# Patient Record
Sex: MALE | Race: WHITE | NOT HISPANIC OR LATINO | Employment: OTHER | URBAN - METROPOLITAN AREA
[De-identification: names, ages, dates, MRNs, and addresses within clinical notes are randomized per-mention and may not be internally consistent; named-entity substitution may affect disease eponyms.]

---

## 2019-12-03 ENCOUNTER — HISTORICAL (OUTPATIENT)
Dept: RADIOLOGY | Facility: HOSPITAL | Age: 79
End: 2019-12-03

## 2023-04-26 ENCOUNTER — HOSPITAL ENCOUNTER (INPATIENT)
Facility: HOSPITAL | Age: 83
LOS: 11 days | Discharge: HOME | DRG: 177 | End: 2023-05-08
Attending: FAMILY MEDICINE | Admitting: FAMILY MEDICINE
Payer: MEDICARE

## 2023-04-26 DIAGNOSIS — A49.8 CLOSTRIDIUM DIFFICILE INFECTION: Primary | ICD-10-CM

## 2023-04-26 DIAGNOSIS — Z01.818 PREOP EXAMINATION: ICD-10-CM

## 2023-04-26 DIAGNOSIS — E86.0 DEHYDRATION: ICD-10-CM

## 2023-04-26 DIAGNOSIS — K26.9 DUODENAL ULCER: Chronic | ICD-10-CM

## 2023-04-26 DIAGNOSIS — D62 ACUTE BLOOD LOSS ANEMIA: ICD-10-CM

## 2023-04-26 PROBLEM — R53.1 WEAKNESS: Status: ACTIVE | Noted: 2023-04-26

## 2023-04-26 PROBLEM — D64.89 OTHER SPECIFIED ANEMIAS: Status: ACTIVE | Noted: 2023-04-26

## 2023-04-26 PROBLEM — R19.7 DIARRHEA OF PRESUMED INFECTIOUS ORIGIN: Status: ACTIVE | Noted: 2023-04-26

## 2023-04-26 PROBLEM — I10 ESSENTIAL HYPERTENSION: Status: ACTIVE | Noted: 2023-04-26

## 2023-04-26 PROBLEM — R53.82 CHRONIC FATIGUE: Status: ACTIVE | Noted: 2023-04-26

## 2023-04-26 LAB
AMYLASE SERPL-CCNC: 62 UNIT/L (ref 20–160)
APPEARANCE UR: CLEAR
BILIRUB UR QL STRIP.AUTO: NEGATIVE MG/DL
COLOR UR AUTO: YELLOW
FERRITIN SERPL-MCNC: 42.25 NG/ML (ref 21.81–274.66)
FOLATE SERPL-MCNC: 13.4 NG/ML (ref 7–31.4)
GLUCOSE UR QL STRIP.AUTO: NEGATIVE MG/DL
INR BLD: 1.01 (ref 0–1.3)
IRON SATN MFR SERPL: 5 % (ref 20–50)
IRON SERPL-MCNC: 16 UG/DL (ref 65–175)
KETONES UR QL STRIP.AUTO: NEGATIVE MG/DL
LEUKOCYTE ESTERASE UR QL STRIP.AUTO: NEGATIVE UNIT/L
LIPASE SERPL-CCNC: 34 U/L
NITRITE UR QL STRIP.AUTO: NEGATIVE
PH UR STRIP.AUTO: 5 [PH]
PROT UR QL STRIP.AUTO: NEGATIVE MG/DL
PROTHROMBIN TIME: 13.6 SECONDS (ref 12.5–14.5)
RBC UR QL AUTO: NEGATIVE UNIT/L
RET# (OHS): 0.06 (ref 0.03–0.1)
RETICULOCYTE COUNT AUTOMATED (OLG): 2 % (ref 1.1–2.1)
SP GR UR STRIP.AUTO: 1.02
TIBC SERPL-MCNC: 278 UG/DL (ref 69–240)
TIBC SERPL-MCNC: 294 UG/DL (ref 250–450)
UROBILINOGEN UR STRIP-ACNC: 0.2 MG/DL

## 2023-04-26 PROCEDURE — 82607 VITAMIN B-12: CPT | Performed by: FAMILY MEDICINE

## 2023-04-26 PROCEDURE — 82150 ASSAY OF AMYLASE: CPT | Performed by: FAMILY MEDICINE

## 2023-04-26 PROCEDURE — 82746 ASSAY OF FOLIC ACID SERUM: CPT | Performed by: FAMILY MEDICINE

## 2023-04-26 PROCEDURE — 83550 IRON BINDING TEST: CPT | Performed by: FAMILY MEDICINE

## 2023-04-26 PROCEDURE — 85610 PROTHROMBIN TIME: CPT | Performed by: FAMILY MEDICINE

## 2023-04-26 PROCEDURE — 81003 URINALYSIS AUTO W/O SCOPE: CPT | Performed by: FAMILY MEDICINE

## 2023-04-26 PROCEDURE — 25000003 PHARM REV CODE 250: Performed by: FAMILY MEDICINE

## 2023-04-26 PROCEDURE — 82728 ASSAY OF FERRITIN: CPT | Performed by: FAMILY MEDICINE

## 2023-04-26 PROCEDURE — 84466 ASSAY OF TRANSFERRIN: CPT | Performed by: FAMILY MEDICINE

## 2023-04-26 PROCEDURE — 83690 ASSAY OF LIPASE: CPT | Performed by: FAMILY MEDICINE

## 2023-04-26 PROCEDURE — G0378 HOSPITAL OBSERVATION PER HR: HCPCS

## 2023-04-26 PROCEDURE — 85045 AUTOMATED RETICULOCYTE COUNT: CPT | Performed by: FAMILY MEDICINE

## 2023-04-26 PROCEDURE — G0379 DIRECT REFER HOSPITAL OBSERV: HCPCS

## 2023-04-26 RX ORDER — SIMETHICONE 80 MG
1 TABLET,CHEWABLE ORAL 4 TIMES DAILY PRN
Status: DISCONTINUED | OUTPATIENT
Start: 2023-04-26 | End: 2023-05-08 | Stop reason: HOSPADM

## 2023-04-26 RX ORDER — ACETAMINOPHEN 325 MG/1
650 TABLET ORAL EVERY 8 HOURS PRN
Status: DISCONTINUED | OUTPATIENT
Start: 2023-04-26 | End: 2023-05-08 | Stop reason: HOSPADM

## 2023-04-26 RX ORDER — SPIRONOLACTONE AND HYDROCHLOROTHIAZIDE 25; 25 MG/1; MG/1
1 TABLET ORAL
Status: ON HOLD | COMMUNITY
Start: 2023-04-23 | End: 2023-05-08 | Stop reason: HOSPADM

## 2023-04-26 RX ORDER — HYDRALAZINE HYDROCHLORIDE 25 MG/1
25 TABLET, FILM COATED ORAL 2 TIMES DAILY
COMMUNITY
Start: 2023-04-23

## 2023-04-26 RX ORDER — ONDANSETRON 2 MG/ML
4 INJECTION INTRAMUSCULAR; INTRAVENOUS EVERY 8 HOURS PRN
Status: DISCONTINUED | OUTPATIENT
Start: 2023-04-26 | End: 2023-04-27

## 2023-04-26 RX ORDER — SODIUM CHLORIDE 9 MG/ML
INJECTION, SOLUTION INTRAVENOUS CONTINUOUS
Status: DISCONTINUED | OUTPATIENT
Start: 2023-04-26 | End: 2023-04-27

## 2023-04-26 RX ORDER — OLMESARTAN MEDOXOMIL 40 MG/1
40 TABLET ORAL
COMMUNITY
Start: 2023-04-23

## 2023-04-26 RX ORDER — TAMSULOSIN HYDROCHLORIDE 0.4 MG/1
1 CAPSULE ORAL NIGHTLY
COMMUNITY
Start: 2023-03-23

## 2023-04-26 RX ORDER — AMLODIPINE BESYLATE 10 MG/1
10 TABLET ORAL NIGHTLY
COMMUNITY
Start: 2023-04-23

## 2023-04-26 RX ORDER — MELOXICAM 15 MG/1
15 TABLET ORAL
Status: ON HOLD | COMMUNITY
Start: 2023-04-10 | End: 2023-05-05 | Stop reason: HOSPADM

## 2023-04-26 RX ORDER — FERROUS GLUCONATE 324(38)MG
324 TABLET ORAL
Status: DISCONTINUED | OUTPATIENT
Start: 2023-04-27 | End: 2023-05-08 | Stop reason: HOSPADM

## 2023-04-26 RX ORDER — PANTOPRAZOLE SODIUM 40 MG/1
40 TABLET, DELAYED RELEASE ORAL DAILY
Status: DISCONTINUED | OUTPATIENT
Start: 2023-04-27 | End: 2023-04-27

## 2023-04-26 RX ORDER — NEBIVOLOL 5 MG/1
5 TABLET ORAL
COMMUNITY
Start: 2023-04-24

## 2023-04-26 RX ORDER — SODIUM CHLORIDE 0.9 % (FLUSH) 0.9 %
3 SYRINGE (ML) INJECTION EVERY 12 HOURS PRN
Status: DISCONTINUED | OUTPATIENT
Start: 2023-04-26 | End: 2023-05-08 | Stop reason: HOSPADM

## 2023-04-26 RX ORDER — ACETAMINOPHEN 325 MG/1
650 TABLET ORAL EVERY 4 HOURS PRN
Status: DISCONTINUED | OUTPATIENT
Start: 2023-04-26 | End: 2023-05-08 | Stop reason: HOSPADM

## 2023-04-26 RX ORDER — ASCORBIC ACID 250 MG
250 TABLET ORAL DAILY
Status: DISCONTINUED | OUTPATIENT
Start: 2023-04-27 | End: 2023-05-08 | Stop reason: HOSPADM

## 2023-04-26 RX ADMIN — SODIUM CHLORIDE: 9 INJECTION, SOLUTION INTRAVENOUS at 10:04

## 2023-04-26 RX ADMIN — SODIUM CHLORIDE: 9 INJECTION, SOLUTION INTRAVENOUS at 04:04

## 2023-04-26 NOTE — H&P
[facility]  [facility] Medicine  History & Physical    Patient Name: Claus Mcguire  MRN: [id number]  Patient Class: OP- Observation  Admission Date: 4/26/2023  Attending Physician: Juan Quispe MD   Primary Care Provider: Juan Quispe MD         Patient information was obtained from patient, spouse/SO and ER records.     Subjective:     Principal Problem:Dehydration    Chief Complaint:   Chief Complaint   Patient presents with    Dehydration     Diarrhea x 2 days with weakness and dehydration        HPI: The 82 year old known to me from clinic.  Presented with 3 days of weakness some nausea but no vomiting no recent travel no questionable food.  Labs were done today which showed increased BUN and creatinine and decreased sodium and chloride.  Both consistent with dehydration.  Also noted was a decrease in hemoglobin hematocrit 11 hemoglobin down to 8.1 in last 6 months.  Patient is weak at this time.  We are admitting the patient for IV fluid hydration workup of his diarrhea and workup of his acute anemia.      hypertension    No past surgical history    Review of patient's allergies indicates:   Allergen Reactions    Penicillins Swelling       No current facility-administered medications on file prior to encounter.     No current outpatient medications on file prior to encounter.     Family History    None       Tobacco Use    Smoking status: Not on file    Smokeless tobacco: Not on file   Substance and Sexual Activity    Alcohol use: Not on file    Drug use: Not on file    Sexual activity: Not on file     Review of Systems   Constitutional:  Positive for appetite change and fatigue. Negative for fever.   HENT:  Positive for congestion and rhinorrhea.    Eyes: Negative.    Respiratory:  Positive for cough.    Cardiovascular: Negative.    Gastrointestinal:  Positive for diarrhea and nausea. Negative for vomiting.   Endocrine: Negative.    Genitourinary: Negative.     Musculoskeletal:  Positive for back pain.   Skin:  Positive for rash (to the buttocks).   Allergic/Immunologic: Negative.    Neurological: Negative.    Hematological: Negative.    Psychiatric/Behavioral: Negative.     Objective:     Vital Signs (Most Recent):  Temp: 98.1 °F (36.7 °C) (04/26/23 1649)  Pulse: (!) 58 (04/26/23 1649)  Resp: 18 (04/26/23 1649)  BP: (!) 145/70 (04/26/23 1649)  SpO2: (!) 93 % (04/26/23 1649)   Vital Signs (24h Range):  Temp:  [98.1 °F (36.7 °C)] 98.1 °F (36.7 °C)  Pulse:  [58] 58  Resp:  [18] 18  SpO2:  [93 %] 93 %  BP: (145)/(70) 145/70     Weight: 80 kg (176 lb 5.9 oz)  Body mass index is 27.62 kg/m².    Physical Exam  Constitutional:       Appearance: Normal appearance. He is normal weight. He is ill-appearing.   HENT:      Head: Normocephalic and atraumatic.      Left Ear: Tympanic membrane normal.      Nose: Congestion and rhinorrhea present.      Mouth/Throat:      Mouth: Mucous membranes are moist.      Pharynx: Oropharynx is clear. No oropharyngeal exudate.   Eyes:      Extraocular Movements: Extraocular movements intact.      Conjunctiva/sclera: Conjunctivae normal.      Pupils: Pupils are equal, round, and reactive to light.   Cardiovascular:      Rate and Rhythm: Normal rate and regular rhythm.      Pulses: Normal pulses.      Heart sounds: Normal heart sounds. No murmur heard.    No gallop.   Pulmonary:      Effort: Pulmonary effort is normal.      Breath sounds: Normal breath sounds.   Abdominal:      General: Abdomen is flat. Bowel sounds are normal. There is no distension.      Palpations: Abdomen is soft.   Genitourinary:     Penis: Normal.    Musculoskeletal:         General: Normal range of motion.      Cervical back: Normal range of motion and neck supple.      Right lower leg: No edema.   Skin:     General: Skin is warm and dry.      Comments: Erythematous rash to the buttocks   Neurological:      General: No focal deficit present.      Mental Status: He is alert and  oriented to person, place, and time. Mental status is at baseline.      Cranial Nerves: No cranial nerve deficit.      Motor: No weakness.      Gait: Gait normal.   Psychiatric:         Mood and Affect: Mood normal.         Behavior: Behavior normal.         Thought Content: Thought content normal.         Judgment: Judgment normal.         CRANIAL NERVES     CN III, IV, VI   Pupils are equal, round, and reactive to light.     Significant Labs: All pertinent labs within the past 24 hours have been reviewed.  CBC: No results for input(s): WBC, HGB, HCT, PLT in the last 48 hours.  CMP: No results for input(s): NA, K, CL, CO2, GLU, BUN, CREATININE, CALCIUM, PROT, ALBUMIN, BILITOT, ALKPHOS, AST, ALT, ANIONGAP, EGFRNONAA in the last 48 hours.    Invalid input(s): [place]    Significant Imaging: I have reviewed all pertinent imaging results/findings within the past 24 hours.    Assessment/Plan:     * Dehydration  Admit to observation for iv fluids.   Gi pcr was collected in clinic.  Will get stool for h-pylori, heme occult and culture.  Follow cmp and h/h      Essential hypertension    Hold bp meds for now as currently low bp    Weakness    I v fluids.   likely due to dehydration and anemia    Other specified anemias    Heme occult stools and comprehensive anemia panel  Follow h/h    Diarrhea of presumed infectious origin    Follow gi pcr  And as above      VTE Risk Mitigation (From admission, onward)         Ordered     IP VTE LOW RISK PATIENT  Once         04/26/23 1538     Place sequential compression device  Until discontinued         04/26/23 1538                   On 04/26/2023, patient should be placed in hospital observation services under my care.        Juan Quispe MD  Department of Hospital Medicine  [facility]

## 2023-04-26 NOTE — ASSESSMENT & PLAN NOTE
Admit to observation for iv fluids.   Gi pcr was collected in clinic.  Will get stool for h-pylori, heme occult and culture.  Follow cmp and h/h

## 2023-04-26 NOTE — HPI
The 82 year old known to me from clinic.  Presented with 3 days of weakness some nausea but no vomiting no recent travel no questionable food.  Labs were done today which showed increased BUN and creatinine and decreased sodium and chloride.  Both consistent with dehydration.  Also noted was a decrease in hemoglobin hematocrit 11 hemoglobin down to 8.1 in last 6 months.  Patient is weak at this time.  We are admitting the patient for IV fluid hydration workup of his diarrhea and workup of his acute anemia.

## 2023-04-26 NOTE — SUBJECTIVE & OBJECTIVE
hypertension    No past surgical history    Review of patient's allergies indicates:   Allergen Reactions    Penicillins Swelling       No current facility-administered medications on file prior to encounter.     No current outpatient medications on file prior to encounter.     Family History    None       Tobacco Use    Smoking status: Not on file    Smokeless tobacco: Not on file   Substance and Sexual Activity    Alcohol use: Not on file    Drug use: Not on file    Sexual activity: Not on file     Review of Systems   Constitutional:  Positive for appetite change and fatigue. Negative for fever.   HENT:  Positive for congestion and rhinorrhea.    Eyes: Negative.    Respiratory:  Positive for cough.    Cardiovascular: Negative.    Gastrointestinal:  Positive for diarrhea and nausea. Negative for vomiting.   Endocrine: Negative.    Genitourinary: Negative.    Musculoskeletal:  Positive for back pain.   Skin:  Positive for rash (to the buttocks).   Allergic/Immunologic: Negative.    Neurological: Negative.    Hematological: Negative.    Psychiatric/Behavioral: Negative.     Objective:     Vital Signs (Most Recent):  Temp: 98.1 °F (36.7 °C) (04/26/23 1649)  Pulse: (!) 58 (04/26/23 1649)  Resp: 18 (04/26/23 1649)  BP: (!) 145/70 (04/26/23 1649)  SpO2: (!) 93 % (04/26/23 1649)   Vital Signs (24h Range):  Temp:  [98.1 °F (36.7 °C)] 98.1 °F (36.7 °C)  Pulse:  [58] 58  Resp:  [18] 18  SpO2:  [93 %] 93 %  BP: (145)/(70) 145/70     Weight: 80 kg (176 lb 5.9 oz)  Body mass index is 27.62 kg/m².    Physical Exam  Constitutional:       Appearance: Normal appearance. He is normal weight. He is ill-appearing.   HENT:      Head: Normocephalic and atraumatic.      Left Ear: Tympanic membrane normal.      Nose: Congestion and rhinorrhea present.      Mouth/Throat:      Mouth: Mucous membranes are moist.      Pharynx: Oropharynx is clear. No oropharyngeal exudate.   Eyes:      Extraocular Movements: Extraocular movements intact.       Conjunctiva/sclera: Conjunctivae normal.      Pupils: Pupils are equal, round, and reactive to light.   Cardiovascular:      Rate and Rhythm: Normal rate and regular rhythm.      Pulses: Normal pulses.      Heart sounds: Normal heart sounds. No murmur heard.    No gallop.   Pulmonary:      Effort: Pulmonary effort is normal.      Breath sounds: Normal breath sounds.   Abdominal:      General: Abdomen is flat. Bowel sounds are normal. There is no distension.      Palpations: Abdomen is soft.   Genitourinary:     Penis: Normal.    Musculoskeletal:         General: Normal range of motion.      Cervical back: Normal range of motion and neck supple.      Right lower leg: No edema.   Skin:     General: Skin is warm and dry.      Comments: Erythematous rash to the buttocks   Neurological:      General: No focal deficit present.      Mental Status: He is alert and oriented to person, place, and time. Mental status is at baseline.      Cranial Nerves: No cranial nerve deficit.      Motor: No weakness.      Gait: Gait normal.   Psychiatric:         Mood and Affect: Mood normal.         Behavior: Behavior normal.         Thought Content: Thought content normal.         Judgment: Judgment normal.         CRANIAL NERVES     CN III, IV, VI   Pupils are equal, round, and reactive to light.     Significant Labs: All pertinent labs within the past 24 hours have been reviewed.  CBC: No results for input(s): WBC, HGB, HCT, PLT in the last 48 hours.  CMP: No results for input(s): NA, K, CL, CO2, GLU, BUN, CREATININE, CALCIUM, PROT, ALBUMIN, BILITOT, ALKPHOS, AST, ALT, ANIONGAP, EGFRNONAA in the last 48 hours.    Invalid input(s): [place]    Significant Imaging: I have reviewed all pertinent imaging results/findings within the past 24 hours.

## 2023-04-27 ENCOUNTER — ANESTHESIA EVENT (OUTPATIENT)
Dept: SURGERY | Facility: HOSPITAL | Age: 83
DRG: 177 | End: 2023-04-27
Payer: MEDICARE

## 2023-04-27 ENCOUNTER — ANESTHESIA (OUTPATIENT)
Dept: SURGERY | Facility: HOSPITAL | Age: 83
DRG: 177 | End: 2023-04-27
Payer: MEDICARE

## 2023-04-27 PROBLEM — K62.5 GASTROINTESTINAL HEMORRHAGE ASSOCIATED WITH ANORECTAL SOURCE: Status: ACTIVE | Noted: 2023-04-27

## 2023-04-27 PROBLEM — D62 ACUTE BLOOD LOSS ANEMIA: Chronic | Status: ACTIVE | Noted: 2023-04-27

## 2023-04-27 PROBLEM — A49.8 CLOSTRIDIUM DIFFICILE INFECTION: Status: ACTIVE | Noted: 2023-04-27

## 2023-04-27 PROBLEM — K26.9 DUODENAL ULCER: Chronic | Status: ACTIVE | Noted: 2023-04-27

## 2023-04-27 LAB
ABO + RH BLD: NORMAL
ABO + RH BLD: NORMAL
ABORH RETYPE: NORMAL
ABS NEUT CALC (OHS): 5.62 X10(3)/MCL (ref 2.1–9.2)
ALBUMIN SERPL-MCNC: 2.5 G/DL (ref 3.4–4.8)
ALBUMIN/GLOB SERPL: 0.8 RATIO (ref 1.1–2)
ALP SERPL-CCNC: 59 UNIT/L (ref 40–150)
ALT SERPL-CCNC: 17 UNIT/L (ref 0–55)
AST SERPL-CCNC: 16 UNIT/L (ref 5–34)
BILIRUBIN DIRECT+TOT PNL SERPL-MCNC: 0.1 MG/DL
BLD PROD TYP BPU: NORMAL
BLD PROD TYP BPU: NORMAL
BLOOD UNIT EXPIRATION DATE: NORMAL
BLOOD UNIT EXPIRATION DATE: NORMAL
BLOOD UNIT TYPE CODE: 5100
BLOOD UNIT TYPE CODE: 5100
BUN SERPL-MCNC: 51 MG/DL (ref 8.4–25.7)
CALCIUM SERPL-MCNC: 7.8 MG/DL (ref 8.8–10)
CHLORIDE SERPL-SCNC: 108 MMOL/L (ref 98–107)
CO2 SERPL-SCNC: 21 MMOL/L (ref 23–31)
CREAT SERPL-MCNC: 1.59 MG/DL (ref 0.73–1.18)
CROSSMATCH INTERPRETATION: NORMAL
CROSSMATCH INTERPRETATION: NORMAL
DISPENSE STATUS: NORMAL
DISPENSE STATUS: NORMAL
ERYTHROCYTE [DISTWIDTH] IN BLOOD BY AUTOMATED COUNT: 15.4 % (ref 11.5–17)
GFR SERPLBLD CREATININE-BSD FMLA CKD-EPI: 43 MLS/MIN/1.73/M2
GLOBULIN SER-MCNC: 3.1 GM/DL (ref 2.4–3.5)
GLUCOSE SERPL-MCNC: 117 MG/DL (ref 82–115)
GROUP & RH: NORMAL
HCT VFR BLD AUTO: 22.8 % (ref 42–52)
HGB BLD-MCNC: 6.9 G/DL (ref 14–18)
INDIRECT COOMBS GEL: NORMAL
LYMPHOCYTES NFR BLD MANUAL: 1.33 X10(3)/MCL
LYMPHOCYTES NFR BLD MANUAL: 17 % (ref 13–40)
MCH RBC QN AUTO: 27.2 PG (ref 27–31)
MCHC RBC AUTO-ENTMCNC: 30.3 G/DL (ref 33–36)
MCV RBC AUTO: 89.8 FL (ref 80–94)
MONOCYTES NFR BLD MANUAL: 0.86 X10(3)/MCL (ref 0.1–1.3)
MONOCYTES NFR BLD MANUAL: 11 % (ref 2–11)
NEUTROPHILS NFR BLD MANUAL: 72 % (ref 47–80)
PLATELET # BLD AUTO: 239 X10(3)/MCL (ref 130–400)
PLATELET # BLD EST: ADEQUATE 10*3/UL
PMV BLD AUTO: 9.6 FL (ref 7.4–10.4)
POTASSIUM SERPL-SCNC: 4.3 MMOL/L (ref 3.5–5.1)
PROT SERPL-MCNC: 5.6 GM/DL (ref 5.8–7.6)
RBC # BLD AUTO: 2.54 X10(6)/MCL (ref 4.7–6.1)
RBC MORPH BLD: NORMAL
SODIUM SERPL-SCNC: 135 MMOL/L (ref 136–145)
SPECIMEN OUTDATE: NORMAL
TRANSFERRIN SERPL-MCNC: 268 MG/DL
UNIT NUMBER: NORMAL
UNIT NUMBER: NORMAL
VIT B12 SERPL-MCNC: 543 PG/ML (ref 213–816)
WBC # SPEC AUTO: 7.8 X10(3)/MCL (ref 4.5–11.5)

## 2023-04-27 PROCEDURE — S0030 INJECTION, METRONIDAZOLE: HCPCS | Performed by: FAMILY MEDICINE

## 2023-04-27 PROCEDURE — 63600175 PHARM REV CODE 636 W HCPCS: Performed by: FAMILY MEDICINE

## 2023-04-27 PROCEDURE — 85025 COMPLETE CBC W/AUTO DIFF WBC: CPT | Performed by: FAMILY MEDICINE

## 2023-04-27 PROCEDURE — 25000242 PHARM REV CODE 250 ALT 637 W/ HCPCS: Performed by: FAMILY MEDICINE

## 2023-04-27 PROCEDURE — 36430 TRANSFUSION BLD/BLD COMPNT: CPT

## 2023-04-27 PROCEDURE — 25000003 PHARM REV CODE 250: Performed by: FAMILY MEDICINE

## 2023-04-27 PROCEDURE — 94761 N-INVAS EAR/PLS OXIMETRY MLT: CPT

## 2023-04-27 PROCEDURE — 25000003 PHARM REV CODE 250: Performed by: SURGERY

## 2023-04-27 PROCEDURE — 63600175 PHARM REV CODE 636 W HCPCS: Performed by: SURGERY

## 2023-04-27 PROCEDURE — D9220A PRA ANESTHESIA: Mod: ,,, | Performed by: NURSE ANESTHETIST, CERTIFIED REGISTERED

## 2023-04-27 PROCEDURE — 63600175 PHARM REV CODE 636 W HCPCS: Performed by: NURSE ANESTHETIST, CERTIFIED REGISTERED

## 2023-04-27 PROCEDURE — 86900 BLOOD TYPING SEROLOGIC ABO: CPT | Performed by: FAMILY MEDICINE

## 2023-04-27 PROCEDURE — 25000003 PHARM REV CODE 250: Performed by: NURSE ANESTHETIST, CERTIFIED REGISTERED

## 2023-04-27 PROCEDURE — 27000207 HC ISOLATION

## 2023-04-27 PROCEDURE — 94640 AIRWAY INHALATION TREATMENT: CPT

## 2023-04-27 PROCEDURE — A4216 STERILE WATER/SALINE, 10 ML: HCPCS | Performed by: FAMILY MEDICINE

## 2023-04-27 PROCEDURE — 80053 COMPREHEN METABOLIC PANEL: CPT | Performed by: FAMILY MEDICINE

## 2023-04-27 PROCEDURE — 85027 COMPLETE CBC AUTOMATED: CPT | Performed by: FAMILY MEDICINE

## 2023-04-27 PROCEDURE — P9016 RBC LEUKOCYTES REDUCED: HCPCS | Performed by: FAMILY MEDICINE

## 2023-04-27 PROCEDURE — 37000009 HC ANESTHESIA EA ADD 15 MINS: Performed by: SURGERY

## 2023-04-27 PROCEDURE — 43235 EGD DIAGNOSTIC BRUSH WASH: CPT | Performed by: SURGERY

## 2023-04-27 PROCEDURE — C9113 INJ PANTOPRAZOLE SODIUM, VIA: HCPCS | Performed by: FAMILY MEDICINE

## 2023-04-27 PROCEDURE — 37000008 HC ANESTHESIA 1ST 15 MINUTES: Performed by: SURGERY

## 2023-04-27 PROCEDURE — 86920 COMPATIBILITY TEST SPIN: CPT | Performed by: FAMILY MEDICINE

## 2023-04-27 PROCEDURE — C9113 INJ PANTOPRAZOLE SODIUM, VIA: HCPCS | Performed by: SURGERY

## 2023-04-27 PROCEDURE — D9220A PRA ANESTHESIA: ICD-10-PCS | Mod: ,,, | Performed by: NURSE ANESTHETIST, CERTIFIED REGISTERED

## 2023-04-27 PROCEDURE — 11000001 HC ACUTE MED/SURG PRIVATE ROOM

## 2023-04-27 RX ORDER — TAMSULOSIN HYDROCHLORIDE 0.4 MG/1
1 CAPSULE ORAL NIGHTLY
Status: DISCONTINUED | OUTPATIENT
Start: 2023-04-27 | End: 2023-05-08 | Stop reason: HOSPADM

## 2023-04-27 RX ORDER — AMLODIPINE BESYLATE 10 MG/1
10 TABLET ORAL NIGHTLY
Status: DISCONTINUED | OUTPATIENT
Start: 2023-04-27 | End: 2023-05-08 | Stop reason: HOSPADM

## 2023-04-27 RX ORDER — MORPHINE SULFATE 4 MG/ML
2 INJECTION, SOLUTION INTRAMUSCULAR; INTRAVENOUS EVERY 4 HOURS PRN
Status: DISCONTINUED | OUTPATIENT
Start: 2023-04-27 | End: 2023-05-08 | Stop reason: HOSPADM

## 2023-04-27 RX ORDER — HYDROCHLOROTHIAZIDE 25 MG/1
25 TABLET ORAL DAILY
Status: DISCONTINUED | OUTPATIENT
Start: 2023-04-28 | End: 2023-05-07

## 2023-04-27 RX ORDER — DOXYLAMINE SUCCINATE 25 MG
TABLET ORAL 2 TIMES DAILY
Status: DISCONTINUED | OUTPATIENT
Start: 2023-04-27 | End: 2023-05-08 | Stop reason: HOSPADM

## 2023-04-27 RX ORDER — LIDOCAINE HYDROCHLORIDE 20 MG/ML
INJECTION, SOLUTION EPIDURAL; INFILTRATION; INTRACAUDAL; PERINEURAL
Status: DISCONTINUED | OUTPATIENT
Start: 2023-04-27 | End: 2023-04-27

## 2023-04-27 RX ORDER — FUROSEMIDE 10 MG/ML
40 INJECTION INTRAMUSCULAR; INTRAVENOUS ONCE
Status: COMPLETED | OUTPATIENT
Start: 2023-04-27 | End: 2023-04-27

## 2023-04-27 RX ORDER — DEXTROSE MONOHYDRATE, SODIUM CHLORIDE, AND POTASSIUM CHLORIDE 50; 2.98; 4.5 G/1000ML; G/1000ML; G/1000ML
INJECTION, SOLUTION INTRAVENOUS CONTINUOUS
Status: DISCONTINUED | OUTPATIENT
Start: 2023-04-27 | End: 2023-04-30

## 2023-04-27 RX ORDER — PROPOFOL 10 MG/ML
VIAL (ML) INTRAVENOUS
Status: DISCONTINUED | OUTPATIENT
Start: 2023-04-27 | End: 2023-04-27

## 2023-04-27 RX ORDER — ONDANSETRON 2 MG/ML
4 INJECTION INTRAMUSCULAR; INTRAVENOUS EVERY 6 HOURS PRN
Status: DISCONTINUED | OUTPATIENT
Start: 2023-04-27 | End: 2023-05-08 | Stop reason: HOSPADM

## 2023-04-27 RX ORDER — METRONIDAZOLE 500 MG/100ML
500 INJECTION, SOLUTION INTRAVENOUS
Status: DISCONTINUED | OUTPATIENT
Start: 2023-04-27 | End: 2023-05-04

## 2023-04-27 RX ORDER — IPRATROPIUM BROMIDE AND ALBUTEROL SULFATE 2.5; .5 MG/3ML; MG/3ML
3 SOLUTION RESPIRATORY (INHALATION) EVERY 4 HOURS PRN
Status: DISCONTINUED | OUTPATIENT
Start: 2023-04-27 | End: 2023-05-06

## 2023-04-27 RX ORDER — HYDROCODONE BITARTRATE AND ACETAMINOPHEN 500; 5 MG/1; MG/1
TABLET ORAL
Status: DISCONTINUED | OUTPATIENT
Start: 2023-04-27 | End: 2023-05-01

## 2023-04-27 RX ORDER — SPIRONOLACTONE AND HYDROCHLOROTHIAZIDE 25; 25 MG/1; MG/1
1 TABLET ORAL DAILY
Status: DISCONTINUED | OUTPATIENT
Start: 2023-04-28 | End: 2023-04-27

## 2023-04-27 RX ORDER — HYDRALAZINE HYDROCHLORIDE 20 MG/ML
10 INJECTION INTRAMUSCULAR; INTRAVENOUS EVERY 6 HOURS PRN
Status: DISCONTINUED | OUTPATIENT
Start: 2023-04-27 | End: 2023-05-08 | Stop reason: HOSPADM

## 2023-04-27 RX ORDER — HYDRALAZINE HYDROCHLORIDE 25 MG/1
25 TABLET, FILM COATED ORAL 2 TIMES DAILY
Status: DISCONTINUED | OUTPATIENT
Start: 2023-04-27 | End: 2023-05-08 | Stop reason: HOSPADM

## 2023-04-27 RX ORDER — SPIRONOLACTONE 25 MG/1
25 TABLET ORAL DAILY
Status: DISCONTINUED | OUTPATIENT
Start: 2023-04-28 | End: 2023-05-08 | Stop reason: HOSPADM

## 2023-04-27 RX ORDER — FLUCONAZOLE 100 MG/1
100 TABLET ORAL DAILY
Status: DISCONTINUED | OUTPATIENT
Start: 2023-04-27 | End: 2023-05-04

## 2023-04-27 RX ORDER — METOPROLOL TARTRATE 50 MG/1
50 TABLET ORAL 2 TIMES DAILY
Status: DISCONTINUED | OUTPATIENT
Start: 2023-04-27 | End: 2023-04-27

## 2023-04-27 RX ORDER — LOSARTAN POTASSIUM 25 MG/1
25 TABLET ORAL DAILY
Status: DISCONTINUED | OUTPATIENT
Start: 2023-04-27 | End: 2023-04-27

## 2023-04-27 RX ORDER — CETIRIZINE HYDROCHLORIDE 10 MG/1
10 TABLET ORAL DAILY
Status: DISCONTINUED | OUTPATIENT
Start: 2023-04-27 | End: 2023-05-04

## 2023-04-27 RX ORDER — SUCRALFATE 1 G/10ML
1 SUSPENSION ORAL 3 TIMES DAILY
Status: DISCONTINUED | OUTPATIENT
Start: 2023-04-27 | End: 2023-05-08 | Stop reason: HOSPADM

## 2023-04-27 RX ADMIN — SODIUM CHLORIDE: 9 INJECTION, SOLUTION INTRAVENOUS at 05:04

## 2023-04-27 RX ADMIN — AMLODIPINE BESYLATE 10 MG: 10 TABLET ORAL at 09:04

## 2023-04-27 RX ADMIN — PANTOPRAZOLE SODIUM 8 MG/HR: 40 INJECTION, POWDER, FOR SOLUTION INTRAVENOUS at 06:04

## 2023-04-27 RX ADMIN — IPRATROPIUM BROMIDE AND ALBUTEROL SULFATE 3 ML: .5; 3 SOLUTION RESPIRATORY (INHALATION) at 05:04

## 2023-04-27 RX ADMIN — Medication 3 ML: at 09:04

## 2023-04-27 RX ADMIN — HYDRALAZINE HYDROCHLORIDE 10 MG: 20 INJECTION INTRAMUSCULAR; INTRAVENOUS at 08:04

## 2023-04-27 RX ADMIN — POTASSIUM CHLORIDE, DEXTROSE MONOHYDRATE AND SODIUM CHLORIDE: 300; 5; 450 INJECTION, SOLUTION INTRAVENOUS at 06:04

## 2023-04-27 RX ADMIN — HYDRALAZINE HYDROCHLORIDE 10 MG: 20 INJECTION INTRAMUSCULAR; INTRAVENOUS at 12:04

## 2023-04-27 RX ADMIN — HYDRALAZINE HYDROCHLORIDE 25 MG: 25 TABLET, FILM COATED ORAL at 09:04

## 2023-04-27 RX ADMIN — LIDOCAINE HYDROCHLORIDE 120 MG: 20 INJECTION, SOLUTION EPIDURAL; INFILTRATION; INTRACAUDAL; PERINEURAL at 10:04

## 2023-04-27 RX ADMIN — FUROSEMIDE 40 MG: 10 INJECTION, SOLUTION INTRAMUSCULAR; INTRAVENOUS at 05:04

## 2023-04-27 RX ADMIN — PROPOFOL 30 MG: 10 INJECTION, EMULSION INTRAVENOUS at 10:04

## 2023-04-27 RX ADMIN — PROPOFOL 50 MG: 10 INJECTION, EMULSION INTRAVENOUS at 10:04

## 2023-04-27 RX ADMIN — PANTOPRAZOLE SODIUM 40 MG: 40 INJECTION, POWDER, FOR SOLUTION INTRAVENOUS at 08:04

## 2023-04-27 RX ADMIN — MORPHINE SULFATE 2 MG: 4 INJECTION, SOLUTION INTRAMUSCULAR; INTRAVENOUS at 09:04

## 2023-04-27 RX ADMIN — METOPROLOL TARTRATE 50 MG: 50 TABLET, FILM COATED ORAL at 03:04

## 2023-04-27 RX ADMIN — PANTOPRAZOLE SODIUM 8 MG/HR: 40 INJECTION, POWDER, FOR SOLUTION INTRAVENOUS at 11:04

## 2023-04-27 RX ADMIN — PROPOFOL 80 MG: 10 INJECTION, EMULSION INTRAVENOUS at 10:04

## 2023-04-27 RX ADMIN — TAMSULOSIN HYDROCHLORIDE 0.4 MG: 0.4 CAPSULE ORAL at 09:04

## 2023-04-27 RX ADMIN — SUCRALFATE ORAL 1 G: 1 SUSPENSION ORAL at 09:04

## 2023-04-27 RX ADMIN — SODIUM CHLORIDE 100 ML: 9 INJECTION, SOLUTION INTRAVENOUS at 10:04

## 2023-04-27 RX ADMIN — SUCRALFATE ORAL 1 G: 1 SUSPENSION ORAL at 12:04

## 2023-04-27 RX ADMIN — HYDRALAZINE HYDROCHLORIDE 10 MG: 20 INJECTION INTRAMUSCULAR; INTRAVENOUS at 06:04

## 2023-04-27 RX ADMIN — METRONIDAZOLE 500 MG: 5 INJECTION, SOLUTION INTRAVENOUS at 06:04

## 2023-04-27 NOTE — OP NOTE
Procedure date:  04/27/2023    Indications:  82-year-old white male admitted with dehydration undergoing IV hydration and drop in H&H considered for possible acute blood loss undergoing diagnostic GI workup for possible upper GI bleed.      Preoperative diagnosis:  1. Anemia 2. Dehydration  Postoperative diagnosis:  1.  Acute anemia secondary to duodenal ulcer 2. Dehydration      Procedure performed:  1.  Diagnostic EGD      Procedure in detail:  Patient was brought to the endoscopy suite laid in a slight supine position.  Intravenous anesthesia was provided.  Oral bite plate placed in the mouth.  An endoscope was then passed through the oropharynx intubating the esophagus with easy transit into the stomach.  Upon entry into the stomach was fully insufflated for evaluation.  Scope was then advanced into duodenum reaching the 1st and 2nd portion.  Immediately within this region a raised ulcerated area was identified.  There was no active bleeding.  There was a firm adherent clot which was washed away and exhibited no underlying blood vessel.  It was monitored for a period of time.  No oozing was noted.  The scope was then carefully withdrawn in neutral position.  The remainder of the stomach was evaluated.  The scope was retroflexed revealing a normal appearing cardia fundus and proximal body.  The distal body and antrum were grossly normal in appearance.  The scope was then retrieved back to the Z-line which measured about 40 cm from incisors.  The stomach was evacuated of air.  The remainder of the esophagus appeared to be grossly normal during retrieval of the scope.  The patient was then relieved of anesthesia stable condition and transferred to postanesthesia care unit.      Complications:  None   Estimated blood loss:  None   Specimens:  None     Disposition:  Upon recovery from anesthesia patient will be transferred back to the floor for completion of transfusion and monitoring.    Recommendations:  Recommend  transfusion above hemoglobin of 8., continuous Protonix drip, Carafate 3 times daily, and withholding all NSAID products.  Patient should also remain NPO for approximately 48 hours.    Lisa Ramesh MD

## 2023-04-27 NOTE — HOSPITAL COURSE
Patient is an 82-year-old  male known to me from clinic.  Was direct admitted from clinic for surgery or diarrhea.  Initially thought to be gastroenteritis.  Upon workup was found to have Clostridium difficile though only a day or 2 after admission his diarrhea ceased he was continued on metronidazole throughout his stay however.      He was noted incidentally to have heme-positive stools and was profoundly anemic.  Dr. Betty vasquez consulted performed EGD and found a bleeding gastric and duodenal ulcer the patient was placed on Protonix as well as sucralfate during his stay initially seen have cessation of his bleeding but in his hemoglobin hematocrit dropped again he had blood transfusions twice.  A repeat EGD did show resolution of the bleeding.  Patient's hemoglobin hematocrit was stable thereafter.      His height stay was complicated by some delirium and agitation.  He did require Zyprexa at night to help him sleep in his agitation cleared.  It seemed to follow his anesthesia from his EGDs.      Because of profound weakness the patient's family asked us to try to get him into skilled nursing.  We will attempt to get him in the skilled nursing was found to have a positive COVID test which was retained through the weekend so that we can get an isolation bed.  Patient did have some runny nose throughout his stay but never had decreased sats.  He did have questionable infiltrate on his chest x-ray and was followed with Levaquin throughout his stay.  Had only 1 episode of fever which has since resolved.

## 2023-04-27 NOTE — SUBJECTIVE & OBJECTIVE
hypertension    No past surgical history    Review of patient's allergies indicates:   Allergen Reactions    Penicillins Swelling       No current facility-administered medications on file prior to encounter.     Current Outpatient Medications on File Prior to Encounter   Medication Sig    amLODIPine (NORVASC) 10 MG tablet Take 10 mg by mouth every evening.    hydrALAZINE (APRESOLINE) 25 MG tablet Take 25 mg by mouth 2 (two) times daily.    meloxicam (MOBIC) 15 MG tablet Take 15 mg by mouth.    nebivoloL (BYSTOLIC) 5 MG Tab Take 5 mg by mouth.    olmesartan (BENICAR) 40 MG tablet Take 40 mg by mouth.    spironolactone-hydrochlorothiazide 25-25mg (ALDACTAZIDE) 25-25 mg Tab Take 1 tablet by mouth.    tamsulosin (FLOMAX) 0.4 mg Cap Take 1 capsule by mouth every evening.     Family History    None       Tobacco Use    Smoking status: Former     Packs/day: 2.00     Years: 1.00     Pack years: 2.00     Types: Cigarettes    Smokeless tobacco: Current     Types: Chew   Substance and Sexual Activity    Alcohol use: Yes     Alcohol/week: 6.0 standard drinks     Types: 6 Cans of beer per week    Drug use: Never    Sexual activity: Not Currently     Review of Systems   Constitutional:  Positive for appetite change and fatigue. Negative for fever.   HENT:  Positive for congestion and rhinorrhea.    Eyes: Negative.    Respiratory:  Positive for cough.    Cardiovascular: Negative.    Gastrointestinal:  Positive for diarrhea and nausea. Negative for vomiting.   Endocrine: Negative.    Genitourinary: Negative.    Musculoskeletal:  Positive for back pain.   Skin:  Positive for rash (to the buttocks).   Allergic/Immunologic: Negative.    Neurological: Negative.    Hematological: Negative.    Psychiatric/Behavioral: Negative.     Objective:     Vital Signs (Most Recent):  Temp: 98.3 °F (36.8 °C) (04/27/23 0415)  Pulse: 72 (04/27/23 0415)  Resp: 20 (04/27/23 0415)  BP: (!) 165/58 (04/27/23 0415)  SpO2: (!) 91 % (04/27/23 0415)   Vital  Signs (24h Range):  Temp:  [97.2 °F (36.2 °C)-98.3 °F (36.8 °C)] 98.3 °F (36.8 °C)  Pulse:  [56-72] 72  Resp:  [18-20] 20  SpO2:  [91 %-93 %] 91 %  BP: (145-165)/(54-70) 165/58     Weight: 80 kg (176 lb 5.9 oz)  Body mass index is 27.62 kg/m².    Physical Exam  Constitutional:       Appearance: Normal appearance. He is normal weight. He is ill-appearing.   HENT:      Head: Normocephalic and atraumatic.      Left Ear: Tympanic membrane normal.      Nose: Congestion and rhinorrhea present.      Mouth/Throat:      Mouth: Mucous membranes are moist.      Pharynx: Oropharynx is clear. No oropharyngeal exudate.   Eyes:      Extraocular Movements: Extraocular movements intact.      Conjunctiva/sclera: Conjunctivae normal.      Pupils: Pupils are equal, round, and reactive to light.   Cardiovascular:      Rate and Rhythm: Normal rate and regular rhythm.      Pulses: Normal pulses.      Heart sounds: Normal heart sounds. No murmur heard.    No gallop.   Pulmonary:      Effort: Pulmonary effort is normal.      Breath sounds: Normal breath sounds.   Abdominal:      General: Abdomen is flat. Bowel sounds are normal. There is no distension.      Palpations: Abdomen is soft.   Genitourinary:     Penis: Normal.    Musculoskeletal:         General: Normal range of motion.      Cervical back: Normal range of motion and neck supple.      Right lower leg: No edema.   Skin:     General: Skin is warm and dry.      Comments: Erythematous rash to the buttocks   Neurological:      General: No focal deficit present.      Mental Status: He is alert and oriented to person, place, and time. Mental status is at baseline.      Cranial Nerves: No cranial nerve deficit.      Motor: No weakness.      Gait: Gait normal.   Psychiatric:         Mood and Affect: Mood normal.         Behavior: Behavior normal.         Thought Content: Thought content normal.         Judgment: Judgment normal.         CRANIAL NERVES     CN III, IV, VI   Pupils are equal,  round, and reactive to light.     Significant Labs: All pertinent labs within the past 24 hours have been reviewed.  CBC:   Recent Labs   Lab 04/27/23 0257   WBC 7.8   HGB 6.9*   HCT 22.8*        CMP:   Recent Labs   Lab 04/27/23 0257   *   K 4.3   CO2 21*   BUN 51.0*   CREATININE 1.59*   CALCIUM 7.8*   ALBUMIN 2.5*   BILITOT 0.1   ALKPHOS 59   AST 16   ALT 17       Significant Imaging: I have reviewed all pertinent imaging results/findings within the past 24 hours.

## 2023-04-27 NOTE — ANESTHESIA POSTPROCEDURE EVALUATION
Anesthesia Post Evaluation    Patient: Claus Mcguire    Procedure(s) Performed: Procedure(s) (LRB):  EGD (ESOPHAGOGASTRODUODENOSCOPY) (N/A)    Final Anesthesia Type: general      Patient participation: Yes- Able to Participate  Level of consciousness: awake and alert and oriented  Post-procedure vital signs: reviewed and stable  Pain management: adequate  Airway patency: patent    PONV status at discharge: No PONV  Anesthetic complications: no      Cardiovascular status: stable  Respiratory status: unassisted, spontaneous ventilation and room air  Hydration status: euvolemic  Follow-up not needed.          Vitals Value Taken Time     04/27/23 1107     04/27/23 1107     04/27/23 1107     04/27/23 1107     04/27/23 1107         No case tracking events are documented in the log.      Pain/Pavan Score: No data recorded

## 2023-04-27 NOTE — ASSESSMENT & PLAN NOTE
Heme occult stools and comprehensive anemia panel with iron def.   With significant drop will assume gi bleed as below  Transfuse 2 units  Order ct abdomen no contrast  Follow h/h

## 2023-04-27 NOTE — ASSESSMENT & PLAN NOTE
contiunue iv fluids.   Gi pcr was collected in clinic.  Will get stool for h-pylori, heme occult and culture.  Follow cmp and h/h  Fluids to d5 /12 nds with 40 meq kcl

## 2023-04-27 NOTE — PROGRESS NOTES
[facility]  [facility] Medicine  Progress Note    Patient Name: Claus Mcguire  MRN: [id number]  Patient Class: OP- Observation   Admission Date: 4/26/2023  Length of Stay: 0 days  Attending Physician: Juan Quispe MD  Primary Care Provider: Juan Quispe MD        Subjective:     Principal Problem:Dehydration        HPI:  The 82 year old known to me from clinic.  Presented with 3 days of weakness some nausea but no vomiting no recent travel no questionable food.  Labs were done today which showed increased BUN and creatinine and decreased sodium and chloride.  Both consistent with dehydration.  Also noted was a decrease in hemoglobin hematocrit 11 hemoglobin down to 8.1 in last 6 months.  Patient is weak at this time.  We are admitting the patient for IV fluid hydration workup of his diarrhea and workup of his acute anemia.      Overview/Hospital Course:  Hemoglobin decreased this am to just over 6,   heme occult pending, but stool is dark  Nausea x 2 last night no vomitting  No abdominal pain today  No sob, is coughing and c/o upper respiratory congestion with clear sputum  No cp  No diarrhea since admit      hypertension    No past surgical history    Review of patient's allergies indicates:   Allergen Reactions    Penicillins Swelling       No current facility-administered medications on file prior to encounter.     Current Outpatient Medications on File Prior to Encounter   Medication Sig    amLODIPine (NORVASC) 10 MG tablet Take 10 mg by mouth every evening.    hydrALAZINE (APRESOLINE) 25 MG tablet Take 25 mg by mouth 2 (two) times daily.    meloxicam (MOBIC) 15 MG tablet Take 15 mg by mouth.    nebivoloL (BYSTOLIC) 5 MG Tab Take 5 mg by mouth.    olmesartan (BENICAR) 40 MG tablet Take 40 mg by mouth.    spironolactone-hydrochlorothiazide 25-25mg (ALDACTAZIDE) 25-25 mg Tab Take 1 tablet by mouth.    tamsulosin (FLOMAX) 0.4 mg Cap Take 1 capsule by mouth every evening.      Family History    None       Tobacco Use    Smoking status: Former     Packs/day: 2.00     Years: 1.00     Pack years: 2.00     Types: Cigarettes    Smokeless tobacco: Current     Types: Chew   Substance and Sexual Activity    Alcohol use: Yes     Alcohol/week: 6.0 standard drinks     Types: 6 Cans of beer per week    Drug use: Never    Sexual activity: Not Currently     Review of Systems   Constitutional:  Positive for appetite change and fatigue. Negative for fever.   HENT:  Positive for congestion and rhinorrhea.    Eyes: Negative.    Respiratory:  Positive for cough.    Cardiovascular: Negative.    Gastrointestinal:  Positive for diarrhea and nausea. Negative for vomiting.   Endocrine: Negative.    Genitourinary: Negative.    Musculoskeletal:  Positive for back pain.   Skin:  Positive for rash (to the buttocks).   Allergic/Immunologic: Negative.    Neurological: Negative.    Hematological: Negative.    Psychiatric/Behavioral: Negative.     Objective:     Vital Signs (Most Recent):  Temp: 98.3 °F (36.8 °C) (04/27/23 0415)  Pulse: 72 (04/27/23 0415)  Resp: 20 (04/27/23 0415)  BP: (!) 165/58 (04/27/23 0415)  SpO2: (!) 91 % (04/27/23 0415)   Vital Signs (24h Range):  Temp:  [97.2 °F (36.2 °C)-98.3 °F (36.8 °C)] 98.3 °F (36.8 °C)  Pulse:  [56-72] 72  Resp:  [18-20] 20  SpO2:  [91 %-93 %] 91 %  BP: (145-165)/(54-70) 165/58     Weight: 80 kg (176 lb 5.9 oz)  Body mass index is 27.62 kg/m².    Physical Exam  Constitutional:       Appearance: Normal appearance. He is normal weight. He is ill-appearing.   HENT:      Head: Normocephalic and atraumatic.      Left Ear: Tympanic membrane normal.      Nose: Congestion and rhinorrhea present.      Mouth/Throat:      Mouth: Mucous membranes are moist.      Pharynx: Oropharynx is clear. No oropharyngeal exudate.   Eyes:      Extraocular Movements: Extraocular movements intact.      Conjunctiva/sclera: Conjunctivae normal.      Pupils: Pupils are equal, round, and reactive to  light.   Cardiovascular:      Rate and Rhythm: Normal rate and regular rhythm.      Pulses: Normal pulses.      Heart sounds: Normal heart sounds. No murmur heard.    No gallop.   Pulmonary:      Effort: Pulmonary effort is normal.      Breath sounds: Normal breath sounds.   Abdominal:      General: Abdomen is flat. Bowel sounds are normal. There is no distension.      Palpations: Abdomen is soft.   Genitourinary:     Penis: Normal.    Musculoskeletal:         General: Normal range of motion.      Cervical back: Normal range of motion and neck supple.      Right lower leg: No edema.   Skin:     General: Skin is warm and dry.      Comments: Erythematous rash to the buttocks   Neurological:      General: No focal deficit present.      Mental Status: He is alert and oriented to person, place, and time. Mental status is at baseline.      Cranial Nerves: No cranial nerve deficit.      Motor: No weakness.      Gait: Gait normal.   Psychiatric:         Mood and Affect: Mood normal.         Behavior: Behavior normal.         Thought Content: Thought content normal.         Judgment: Judgment normal.         CRANIAL NERVES     CN III, IV, VI   Pupils are equal, round, and reactive to light.     Significant Labs: All pertinent labs within the past 24 hours have been reviewed.  CBC:   Recent Labs   Lab 04/27/23  0257   WBC 7.8   HGB 6.9*   HCT 22.8*        CMP:   Recent Labs   Lab 04/27/23  0257   *   K 4.3   CO2 21*   BUN 51.0*   CREATININE 1.59*   CALCIUM 7.8*   ALBUMIN 2.5*   BILITOT 0.1   ALKPHOS 59   AST 16   ALT 17       Significant Imaging: I have reviewed all pertinent imaging results/findings within the past 24 hours.      Assessment/Plan:      * Dehydration  contiunue iv fluids.   Gi pcr was collected in clinic.  Will get stool for h-pylori, heme occult and culture.  Follow cmp and h/h  Fluids to d5 /12 nds with 40 meq kcl      Essential hypertension    Hold bp meds for now as currently npo  Hydralazine  prn    Weakness    I v fluids.   likely due to dehydration and anemia    Other specified anemias    Heme occult stools and comprehensive anemia panel with iron def.   With significant drop will assume gi bleed as below  Transfuse 2 units  Order ct abdomen no contrast  Follow h/h    Diarrhea of presumed infectious origin    Follow gi pcr  And as above  Presumed heme positive      VTE Risk Mitigation (From admission, onward)           Ordered     IP VTE LOW RISK PATIENT  Once         04/26/23 1538     Place sequential compression device  Until discontinued         04/26/23 1538                    Discharge Planning   KB:      Code Status: Full Code   Is the patient medically ready for discharge?:     Reason for patient still in hospital (select all that apply): Patient trending condition  C-diff positive per pcr gi done in office 4/26                     Juan Quispe MD  Department of Hospital Medicine   [facility]

## 2023-04-27 NOTE — ANESTHESIA PREPROCEDURE EVALUATION
04/27/2023  Claus Mcguire is a 82 y.o., male.      Pre-op Assessment    I have reviewed the Patient Summary Reports.    I have reviewed the NPO Status.   I have reviewed the Medications.     Review of Systems  Anesthesia Hx:  No problems with previous Anesthesia  Neg history of prior surgery. Denies Family Hx of Anesthesia complications.   Denies Personal Hx of Anesthesia complications.   Social:  Smoker    Hematology/Oncology:     Oncology Normal    -- Anemia: Hematology Comments: Dehydration    EENT/Dental:EENT/Dental Normal   Cardiovascular:   Hypertension hyperlipidemia    Pulmonary:  Pulmonary Normal    Renal/:  Renal/ Normal     Hepatic/GI:   GERD, poorly controlled    Musculoskeletal:  Musculoskeletal Normal    Neurological:  Neurology Normal    Endocrine:  Endocrine Normal    Dermatological:  Skin Normal    Psych:  Psychiatric Normal           Physical Exam  General: Alert    Airway:  Mallampati: I   Mouth Opening: Normal  TM Distance: Normal  Tongue: Normal  Neck ROM: Normal ROM    Dental:  Intact        Anesthesia Plan  Type of Anesthesia, risks & benefits discussed:    Anesthesia Type: Gen Natural Airway  Intra-op Monitoring Plan: Standard ASA Monitors  Post Op Pain Control Plan: multimodal analgesia  Induction:  IV  Informed Consent: Informed consent signed with the Patient representative and all parties understand the risks and agree with anesthesia plan.  All questions answered. Patient consented to blood products? Yes  ASA Score: 3  Day of Surgery Review of History & Physical: I have interviewed and examined the patient. I have reviewed the patient's H&P dated: There are no significant changes.     Ready For Surgery From Anesthesia Perspective.     .

## 2023-04-28 PROBLEM — K25.4 CHRONIC GASTRIC ULCER WITH HEMORRHAGE: Status: ACTIVE | Noted: 2023-04-28

## 2023-04-28 PROBLEM — R93.89 INFILTRATE NOTED ON IMAGING STUDY: Status: ACTIVE | Noted: 2023-04-28

## 2023-04-28 PROBLEM — R45.1 AGITATION: Status: ACTIVE | Noted: 2023-04-28

## 2023-04-28 LAB
ALBUMIN SERPL-MCNC: 2.4 G/DL (ref 3.4–4.8)
ALBUMIN/GLOB SERPL: 0.8 RATIO (ref 1.1–2)
ALP SERPL-CCNC: 53 UNIT/L (ref 40–150)
ALT SERPL-CCNC: 16 UNIT/L (ref 0–55)
AST SERPL-CCNC: 17 UNIT/L (ref 5–34)
BASOPHILS # BLD AUTO: 0 X10(3)/MCL (ref 0–0.2)
BASOPHILS NFR BLD AUTO: 0 %
BILIRUBIN DIRECT+TOT PNL SERPL-MCNC: 0.2 MG/DL
BUN SERPL-MCNC: 36 MG/DL (ref 8.4–25.7)
CALCIUM SERPL-MCNC: 8 MG/DL (ref 8.8–10)
CHLORIDE SERPL-SCNC: 111 MMOL/L (ref 98–107)
CO2 SERPL-SCNC: 17 MMOL/L (ref 23–31)
CREAT SERPL-MCNC: 1.47 MG/DL (ref 0.73–1.18)
EOSINOPHIL # BLD AUTO: 0 X10(3)/MCL (ref 0–0.9)
EOSINOPHIL NFR BLD AUTO: 0 %
ERYTHROCYTE [DISTWIDTH] IN BLOOD BY AUTOMATED COUNT: 15.5 % (ref 11.5–17)
GFR SERPLBLD CREATININE-BSD FMLA CKD-EPI: 48 MLS/MIN/1.73/M2
GLOBULIN SER-MCNC: 3 GM/DL (ref 2.4–3.5)
GLUCOSE SERPL-MCNC: 129 MG/DL (ref 82–115)
HCT VFR BLD AUTO: 26.4 % (ref 42–52)
HCT VFR BLD AUTO: 26.4 % (ref 42–52)
HCT VFR BLD AUTO: 29.1 % (ref 42–52)
HGB BLD-MCNC: 8.2 G/DL (ref 14–18)
HGB BLD-MCNC: 8.3 G/DL (ref 14–18)
HGB BLD-MCNC: 8.8 G/DL (ref 14–18)
IMM GRANULOCYTES # BLD AUTO: 0.06 X10(3)/MCL (ref 0–0.04)
IMM GRANULOCYTES NFR BLD AUTO: 0.8 %
LYMPHOCYTES # BLD AUTO: 0.91 X10(3)/MCL (ref 0.6–4.6)
LYMPHOCYTES NFR BLD AUTO: 12.6 %
MCH RBC QN AUTO: 28.3 PG (ref 27–31)
MCHC RBC AUTO-ENTMCNC: 30.2 G/DL (ref 33–36)
MCV RBC AUTO: 93.6 FL (ref 80–94)
MONOCYTES # BLD AUTO: 1.14 X10(3)/MCL (ref 0.1–1.3)
MONOCYTES NFR BLD AUTO: 15.7 %
NEUTROPHILS # BLD AUTO: 5.14 X10(3)/MCL (ref 2.1–9.2)
NEUTROPHILS NFR BLD AUTO: 70.9 %
PLATELET # BLD AUTO: 125 X10(3)/MCL (ref 130–400)
PMV BLD AUTO: 10.7 FL (ref 7.4–10.4)
POTASSIUM SERPL-SCNC: 4.7 MMOL/L (ref 3.5–5.1)
PROT SERPL-MCNC: 5.4 GM/DL (ref 5.8–7.6)
RBC # BLD AUTO: 3.11 X10(6)/MCL (ref 4.7–6.1)
SODIUM SERPL-SCNC: 137 MMOL/L (ref 136–145)
WBC # SPEC AUTO: 7.3 X10(3)/MCL (ref 4.5–11.5)

## 2023-04-28 PROCEDURE — 63700000 PHARM REV CODE 250 ALT 637 W/O HCPCS: Performed by: FAMILY MEDICINE

## 2023-04-28 PROCEDURE — 36569 INSJ PICC 5 YR+ W/O IMAGING: CPT

## 2023-04-28 PROCEDURE — 63600175 PHARM REV CODE 636 W HCPCS: Performed by: FAMILY MEDICINE

## 2023-04-28 PROCEDURE — 80053 COMPREHEN METABOLIC PANEL: CPT | Performed by: FAMILY MEDICINE

## 2023-04-28 PROCEDURE — A4216 STERILE WATER/SALINE, 10 ML: HCPCS | Performed by: FAMILY MEDICINE

## 2023-04-28 PROCEDURE — S0030 INJECTION, METRONIDAZOLE: HCPCS | Performed by: FAMILY MEDICINE

## 2023-04-28 PROCEDURE — 25000003 PHARM REV CODE 250: Performed by: SURGERY

## 2023-04-28 PROCEDURE — 94640 AIRWAY INHALATION TREATMENT: CPT

## 2023-04-28 PROCEDURE — 25000242 PHARM REV CODE 250 ALT 637 W/ HCPCS: Performed by: FAMILY MEDICINE

## 2023-04-28 PROCEDURE — 27000221 HC OXYGEN, UP TO 24 HOURS

## 2023-04-28 PROCEDURE — 85025 COMPLETE CBC W/AUTO DIFF WBC: CPT | Performed by: FAMILY MEDICINE

## 2023-04-28 PROCEDURE — C1751 CATH, INF, PER/CENT/MIDLINE: HCPCS

## 2023-04-28 PROCEDURE — 85014 HEMATOCRIT: CPT | Performed by: SURGERY

## 2023-04-28 PROCEDURE — 94761 N-INVAS EAR/PLS OXIMETRY MLT: CPT

## 2023-04-28 PROCEDURE — 63600175 PHARM REV CODE 636 W HCPCS: Performed by: SURGERY

## 2023-04-28 PROCEDURE — 27000207 HC ISOLATION

## 2023-04-28 PROCEDURE — 99900035 HC TECH TIME PER 15 MIN (STAT)

## 2023-04-28 PROCEDURE — C9113 INJ PANTOPRAZOLE SODIUM, VIA: HCPCS | Performed by: SURGERY

## 2023-04-28 PROCEDURE — 25000003 PHARM REV CODE 250: Performed by: FAMILY MEDICINE

## 2023-04-28 PROCEDURE — 11000001 HC ACUTE MED/SURG PRIVATE ROOM

## 2023-04-28 RX ORDER — SODIUM CHLORIDE 0.9 % (FLUSH) 0.9 %
10 SYRINGE (ML) INJECTION EVERY 6 HOURS
Status: DISCONTINUED | OUTPATIENT
Start: 2023-04-28 | End: 2023-05-08 | Stop reason: HOSPADM

## 2023-04-28 RX ORDER — CLONIDINE 0.1 MG/24H
1 PATCH, EXTENDED RELEASE TRANSDERMAL
Status: DISCONTINUED | OUTPATIENT
Start: 2023-04-28 | End: 2023-05-08 | Stop reason: HOSPADM

## 2023-04-28 RX ORDER — OLANZAPINE 2.5 MG/1
10 TABLET ORAL NIGHTLY
Status: DISCONTINUED | OUTPATIENT
Start: 2023-04-28 | End: 2023-05-01

## 2023-04-28 RX ORDER — LEVOFLOXACIN 5 MG/ML
500 INJECTION, SOLUTION INTRAVENOUS
Status: DISCONTINUED | OUTPATIENT
Start: 2023-04-28 | End: 2023-05-07

## 2023-04-28 RX ORDER — NEBIVOLOL 5 MG/1
10 TABLET ORAL 2 TIMES DAILY
Status: DISCONTINUED | OUTPATIENT
Start: 2023-04-28 | End: 2023-05-04

## 2023-04-28 RX ORDER — SODIUM CHLORIDE 0.9 % (FLUSH) 0.9 %
10 SYRINGE (ML) INJECTION
Status: DISCONTINUED | OUTPATIENT
Start: 2023-04-28 | End: 2023-05-08 | Stop reason: HOSPADM

## 2023-04-28 RX ORDER — MUPIROCIN 20 MG/G
OINTMENT TOPICAL 2 TIMES DAILY
Status: DISPENSED | OUTPATIENT
Start: 2023-04-28 | End: 2023-05-03

## 2023-04-28 RX ADMIN — CETIRIZINE HYDROCHLORIDE 10 MG: 10 TABLET, FILM COATED ORAL at 09:04

## 2023-04-28 RX ADMIN — SUCRALFATE ORAL 1 G: 1 SUSPENSION ORAL at 09:04

## 2023-04-28 RX ADMIN — PANTOPRAZOLE SODIUM 8 MG/HR: 40 INJECTION, POWDER, FOR SOLUTION INTRAVENOUS at 09:04

## 2023-04-28 RX ADMIN — HYDRALAZINE HYDROCHLORIDE 25 MG: 25 TABLET, FILM COATED ORAL at 09:04

## 2023-04-28 RX ADMIN — FLUCONAZOLE 100 MG: 100 TABLET ORAL at 09:04

## 2023-04-28 RX ADMIN — AMLODIPINE BESYLATE 10 MG: 10 TABLET ORAL at 09:04

## 2023-04-28 RX ADMIN — SPIRONOLACTONE 25 MG: 25 TABLET ORAL at 09:04

## 2023-04-28 RX ADMIN — POTASSIUM CHLORIDE, DEXTROSE MONOHYDRATE AND SODIUM CHLORIDE: 300; 5; 450 INJECTION, SOLUTION INTRAVENOUS at 02:04

## 2023-04-28 RX ADMIN — FERROUS GLUCONATE 324 MG: 324 TABLET ORAL at 09:04

## 2023-04-28 RX ADMIN — SODIUM CHLORIDE, PRESERVATIVE FREE 10 ML: 5 INJECTION INTRAVENOUS at 05:04

## 2023-04-28 RX ADMIN — SUCRALFATE ORAL 1 G: 1 SUSPENSION ORAL at 02:04

## 2023-04-28 RX ADMIN — IPRATROPIUM BROMIDE AND ALBUTEROL SULFATE 3 ML: .5; 3 SOLUTION RESPIRATORY (INHALATION) at 08:04

## 2023-04-28 RX ADMIN — TAMSULOSIN HYDROCHLORIDE 0.4 MG: 0.4 CAPSULE ORAL at 09:04

## 2023-04-28 RX ADMIN — MUPIROCIN 1 G: 20 OINTMENT TOPICAL at 09:04

## 2023-04-28 RX ADMIN — SODIUM CHLORIDE, PRESERVATIVE FREE 10 ML: 5 INJECTION INTRAVENOUS at 12:04

## 2023-04-28 RX ADMIN — POTASSIUM CHLORIDE, DEXTROSE MONOHYDRATE AND SODIUM CHLORIDE: 300; 5; 450 INJECTION, SOLUTION INTRAVENOUS at 04:04

## 2023-04-28 RX ADMIN — Medication 250 MG: at 09:04

## 2023-04-28 RX ADMIN — METRONIDAZOLE 500 MG: 5 INJECTION, SOLUTION INTRAVENOUS at 02:04

## 2023-04-28 RX ADMIN — OLANZAPINE 10 MG: 2.5 TABLET, FILM COATED ORAL at 09:04

## 2023-04-28 RX ADMIN — LEVOFLOXACIN 500 MG: 500 INJECTION, SOLUTION INTRAVENOUS at 09:04

## 2023-04-28 RX ADMIN — METRONIDAZOLE 500 MG: 5 INJECTION, SOLUTION INTRAVENOUS at 05:04

## 2023-04-28 RX ADMIN — PANTOPRAZOLE SODIUM 8 MG/HR: 40 INJECTION, POWDER, FOR SOLUTION INTRAVENOUS at 04:04

## 2023-04-28 RX ADMIN — METRONIDAZOLE 500 MG: 5 INJECTION, SOLUTION INTRAVENOUS at 09:04

## 2023-04-28 RX ADMIN — ACETAMINOPHEN 650 MG: 325 TABLET ORAL at 05:04

## 2023-04-28 RX ADMIN — SODIUM CHLORIDE, PRESERVATIVE FREE 10 ML: 5 INJECTION INTRAVENOUS at 11:04

## 2023-04-28 RX ADMIN — CLONIDINE 1 PATCH: 0.1 PATCH TRANSDERMAL at 02:04

## 2023-04-28 RX ADMIN — HYDROCHLOROTHIAZIDE 25 MG: 25 TABLET ORAL at 09:04

## 2023-04-28 RX ADMIN — NEBIVOLOL 10 MG: 5 TABLET ORAL at 10:04

## 2023-04-28 RX ADMIN — PANTOPRAZOLE SODIUM 8 MG/HR: 40 INJECTION, POWDER, FOR SOLUTION INTRAVENOUS at 02:04

## 2023-04-28 NOTE — PROCEDURES
"Halina Mcguire is a 82 y.o. male patient.    Temp: 99 °F (37.2 °C) (04/28/23 0343)  Pulse: (!) 59 (04/28/23 0343)  Resp: 20 (04/28/23 0343)  BP: (!) 155/58 (04/28/23 0343)  SpO2: (!) 91 % (04/28/23 0343)  Weight: 80 kg (176 lb 5.9 oz) (04/27/23 0815)  Height: 5' 7" (170.2 cm) (04/27/23 0815)    PICC  Date/Time: 4/28/2023 7:46 AM  Performed by: Annalise Ames RN  Consent Done: Yes  Time out: Immediately prior to procedure a time out was called to verify the correct patient, procedure, equipment, support staff and site/side marked as required  Indications: vascular access  Anesthesia: local infiltration  Local anesthetic: lidocaine 1% without epinephrine  Anesthetic Total (mL): 5  Preparation: skin prepped with ChloraPrep  Skin prep agent dried: skin prep agent completely dried prior to procedure  Sterile barriers: all five maximum sterile barriers used - cap, mask, sterile gown, sterile gloves, and large sterile sheet  Hand hygiene: hand hygiene performed prior to central venous catheter insertion  Location details: left brachial  Catheter type: double lumen  Catheter size: 5 Fr  Catheter Length: 42cm    Ultrasound guidance: yes  Vessel Caliber: medium and patent, compressibility normal  Needle advanced into vessel with real time Ultrasound guidance.  Guidewire confirmed in vessel.  Sterile sheath used.  Number of attempts: 1  Post-procedure: blood return through all ports, sterile dressing applied and chlorhexidine patch    Assessment: placement verified by x-ray  Complications: none  Comments: Arm circumference 29 cm. Long term antibtiotics        Name Annalise Ames RN  4/28/2023    "

## 2023-04-28 NOTE — ASSESSMENT & PLAN NOTE
s/p  Transfuse 2 units  Order ct abdomen no contrast with swelling to the duodenum and distal stomach consistent with ulcer  Follow h/h

## 2023-04-28 NOTE — PROGRESS NOTES
[facility]  [facility] Medicine  Progress Note    Patient Name: Claus Mcguire  MRN: [id number]  Patient Class: IP- Inpatient   Admission Date: 4/26/2023  Length of Stay: 1 days  Attending Physician: Juan Quispe MD  Primary Care Provider: Juan Quispe MD        Subjective:     Principal Problem:Dehydration        HPI:  The 82 year old known to me from clinic.  Presented with 3 days of weakness some nausea but no vomiting no recent travel no questionable food.  Labs were done today which showed increased BUN and creatinine and decreased sodium and chloride.  Both consistent with dehydration.  Also noted was a decrease in hemoglobin hematocrit 11 hemoglobin down to 8.1 in last 6 months.  Patient is weak at this time.  We are admitting the patient for IV fluid hydration workup of his diarrhea and workup of his acute anemia.      Overview/Hospital Course:  Hemoglobin increased this am to 8.8,   heme occult pending, but stool is dark  Nausea has resolved  No abdominal pain today  No sob, is coughing and c/o upper respiratory congestion with clear sputum  No cp  No diarrhea since admit  Had some agitation last night      hypertension    No past surgical history    Review of patient's allergies indicates:   Allergen Reactions    Penicillins Swelling       No current facility-administered medications on file prior to encounter.     Current Outpatient Medications on File Prior to Encounter   Medication Sig    amLODIPine (NORVASC) 10 MG tablet Take 10 mg by mouth every evening.    hydrALAZINE (APRESOLINE) 25 MG tablet Take 25 mg by mouth 2 (two) times daily.    meloxicam (MOBIC) 15 MG tablet Take 15 mg by mouth.    nebivoloL (BYSTOLIC) 5 MG Tab Take 5 mg by mouth.    olmesartan (BENICAR) 40 MG tablet Take 40 mg by mouth.    spironolactone-hydrochlorothiazide 25-25mg (ALDACTAZIDE) 25-25 mg Tab Take 1 tablet by mouth.    tamsulosin (FLOMAX) 0.4 mg Cap Take 1 capsule by mouth  every evening.     Family History    None       Tobacco Use    Smoking status: Former     Packs/day: 2.00     Years: 1.00     Pack years: 2.00     Types: Cigarettes    Smokeless tobacco: Current     Types: Chew   Substance and Sexual Activity    Alcohol use: Yes     Alcohol/week: 6.0 standard drinks     Types: 6 Cans of beer per week    Drug use: Never    Sexual activity: Not Currently     Review of Systems   Constitutional:  Positive for appetite change and fatigue. Negative for fever.   HENT:  Positive for congestion and rhinorrhea.    Eyes: Negative.    Respiratory:  Positive for cough.    Cardiovascular: Negative.    Gastrointestinal:  Positive for diarrhea and nausea. Negative for vomiting.   Endocrine: Negative.    Genitourinary: Negative.    Musculoskeletal:  Positive for back pain.   Skin:  Positive for rash (to the buttocks).   Allergic/Immunologic: Negative.    Neurological: Negative.    Hematological: Negative.    Psychiatric/Behavioral: Negative.     Objective:     Vital Signs (Most Recent):  Temp: 99 °F (37.2 °C) (04/28/23 0343)  Pulse: (!) 59 (04/28/23 0343)  Resp: 20 (04/28/23 0343)  BP: (!) 155/58 (04/28/23 0343)  SpO2: (!) 91 % (04/28/23 0343)   Vital Signs (24h Range):  Temp:  [96.8 °F (36 °C)-99 °F (37.2 °C)] 99 °F (37.2 °C)  Pulse:  [56-85] 59  Resp:  [16-20] 20  SpO2:  [87 %-97 %] 91 %  BP: (138-193)/(45-68) 155/58     Weight: 80 kg (176 lb 5.9 oz)  Body mass index is 27.62 kg/m².    Physical Exam  Constitutional:       Appearance: Normal appearance. He is normal weight. He is ill-appearing.   HENT:      Head: Normocephalic and atraumatic.      Left Ear: Tympanic membrane normal.      Nose: Congestion and rhinorrhea present.      Mouth/Throat:      Mouth: Mucous membranes are moist.      Pharynx: Oropharynx is clear. No oropharyngeal exudate.   Eyes:      Extraocular Movements: Extraocular movements intact.      Conjunctiva/sclera: Conjunctivae normal.      Pupils: Pupils are equal, round,  and reactive to light.   Cardiovascular:      Rate and Rhythm: Normal rate and regular rhythm.      Pulses: Normal pulses.      Heart sounds: Normal heart sounds. No murmur heard.    No gallop.   Pulmonary:      Effort: Pulmonary effort is normal.      Breath sounds: Normal breath sounds.   Abdominal:      General: Abdomen is flat. Bowel sounds are normal. There is no distension.      Palpations: Abdomen is soft.   Genitourinary:     Penis: Normal.    Musculoskeletal:         General: Normal range of motion.      Cervical back: Normal range of motion and neck supple.      Right lower leg: No edema.   Skin:     General: Skin is warm and dry.      Comments: Erythematous rash to the buttocks   Neurological:      General: No focal deficit present.      Mental Status: He is alert and oriented to person, place, and time. Mental status is at baseline.      Cranial Nerves: No cranial nerve deficit.      Motor: No weakness.      Gait: Gait normal.   Psychiatric:         Mood and Affect: Mood normal.         Behavior: Behavior normal.         Thought Content: Thought content normal.         Judgment: Judgment normal.         CRANIAL NERVES     CN III, IV, VI   Pupils are equal, round, and reactive to light.     Significant Labs: All pertinent labs within the past 24 hours have been reviewed.  CBC:   Recent Labs   Lab 04/27/23 0257 04/28/23  0443   WBC 7.8 7.3   HGB 6.9* 8.8*   HCT 22.8* 29.1*    125*       CMP:   Recent Labs   Lab 04/27/23 0257 04/28/23  0443   * 137   K 4.3 4.7   CO2 21* 17*   BUN 51.0* 36.0*   CREATININE 1.59* 1.47*   CALCIUM 7.8* 8.0*   ALBUMIN 2.5* 2.4*   BILITOT 0.1 0.2   ALKPHOS 59 53   AST 16 17   ALT 17 16         Significant Imaging: I have reviewed all pertinent imaging results/findings within the past 24 hours.      Assessment/Plan:      * Dehydration  contiunue iv fluids.   Gi pcr was collected in clinic pos for c-diff.    Follow cmp and h/h  Fluids  d5 /12 nds with 40 meq kcl at  100cc      Clostridium difficile infection  continue metronidazole      Duodenal ulcer    continue protonoix drip  carafate tid  Npo     Acute blood loss anemia    Improved after transfusion  Follow h/h continue iron    Gastrointestinal hemorrhage associated with anorectal source    As above    Essential hypertension    Hold bp meds for now as currently npo except meds  Hydralazine prn    Weakness    I v fluids.   likely due to dehydration and anemia    Other specified anemias    s/p  Transfuse 2 units  Order ct abdomen no contrast with swelling to the duodenum and distal stomach consistent with ulcer  Follow h/h    Diarrhea of presumed infectious origin    Metronidazole for c-diff      VTE Risk Mitigation (From admission, onward)         Ordered     IP VTE LOW RISK PATIENT  Once         04/26/23 1538     Place sequential compression device  Until discontinued         04/26/23 1538                Discharge Planning   KB:      Code Status: Full Code   Is the patient medically ready for discharge?:     Reason for patient still in hospital (select all that apply): Patient trending condition                     Juan Quispe MD  Department of Hospital Medicine   [facility]

## 2023-04-28 NOTE — PLAN OF CARE
Problem: Adult Inpatient Plan of Care  Goal: Plan of Care Review  4/27/2023 2257 by Olive Reyez RN  Outcome: Ongoing, Progressing  4/27/2023 2256 by Olive Reyez RN  Outcome: Ongoing, Progressing  Goal: Patient-Specific Goal (Individualized)  4/27/2023 2257 by Olive Reyez RN  Outcome: Ongoing, Progressing  4/27/2023 2256 by Olive Reyez RN  Outcome: Ongoing, Progressing  Goal: Absence of Hospital-Acquired Illness or Injury  4/27/2023 2257 by Olive Reyez RN  Outcome: Ongoing, Progressing  4/27/2023 2256 by Olive Reyez RN  Outcome: Ongoing, Progressing  Goal: Optimal Comfort and Wellbeing  4/27/2023 2257 by Olive Reyez RN  Outcome: Ongoing, Progressing  4/27/2023 2256 by Olive Reyez RN  Outcome: Ongoing, Progressing  Goal: Readiness for Transition of Care  4/27/2023 2257 by Olive Reyez RN  Outcome: Ongoing, Progressing  4/27/2023 2256 by Olive Reyez RN  Outcome: Ongoing, Progressing     Problem: Infection  Goal: Absence of Infection Signs and Symptoms  4/27/2023 2257 by Olive Reyez RN  Outcome: Ongoing, Progressing  4/27/2023 2256 by Olive Reyez RN  Outcome: Ongoing, Progressing     Problem: Skin Injury Risk Increased  Goal: Skin Health and Integrity  4/27/2023 2257 by Olive Reyez RN  Outcome: Ongoing, Progressing  4/27/2023 2256 by Olive Reyez RN  Outcome: Ongoing, Progressing     Problem: Fluid Volume Deficit  Goal: Fluid Balance  Outcome: Ongoing, Progressing     Problem: Fatigue  Goal: Improved Activity Tolerance  Outcome: Ongoing, Progressing     Problem: Anemia  Goal: Anemia Symptom Improvement  Outcome: Ongoing, Progressing

## 2023-04-28 NOTE — SUBJECTIVE & OBJECTIVE
hypertension    No past surgical history    Review of patient's allergies indicates:   Allergen Reactions    Penicillins Swelling       No current facility-administered medications on file prior to encounter.     Current Outpatient Medications on File Prior to Encounter   Medication Sig    amLODIPine (NORVASC) 10 MG tablet Take 10 mg by mouth every evening.    hydrALAZINE (APRESOLINE) 25 MG tablet Take 25 mg by mouth 2 (two) times daily.    meloxicam (MOBIC) 15 MG tablet Take 15 mg by mouth.    nebivoloL (BYSTOLIC) 5 MG Tab Take 5 mg by mouth.    olmesartan (BENICAR) 40 MG tablet Take 40 mg by mouth.    spironolactone-hydrochlorothiazide 25-25mg (ALDACTAZIDE) 25-25 mg Tab Take 1 tablet by mouth.    tamsulosin (FLOMAX) 0.4 mg Cap Take 1 capsule by mouth every evening.     Family History    None       Tobacco Use    Smoking status: Former     Packs/day: 2.00     Years: 1.00     Pack years: 2.00     Types: Cigarettes    Smokeless tobacco: Current     Types: Chew   Substance and Sexual Activity    Alcohol use: Yes     Alcohol/week: 6.0 standard drinks     Types: 6 Cans of beer per week    Drug use: Never    Sexual activity: Not Currently     Review of Systems   Constitutional:  Positive for appetite change and fatigue. Negative for fever.   HENT:  Positive for congestion and rhinorrhea.    Eyes: Negative.    Respiratory:  Positive for cough.    Cardiovascular: Negative.    Gastrointestinal:  Positive for diarrhea and nausea. Negative for vomiting.   Endocrine: Negative.    Genitourinary: Negative.    Musculoskeletal:  Positive for back pain.   Skin:  Positive for rash (to the buttocks).   Allergic/Immunologic: Negative.    Neurological: Negative.    Hematological: Negative.    Psychiatric/Behavioral: Negative.     Objective:     Vital Signs (Most Recent):  Temp: 99 °F (37.2 °C) (04/28/23 0343)  Pulse: (!) 59 (04/28/23 0343)  Resp: 20 (04/28/23 0343)  BP: (!) 155/58 (04/28/23 0343)  SpO2: (!) 91 % (04/28/23 0343)   Vital  Signs (24h Range):  Temp:  [96.8 °F (36 °C)-99 °F (37.2 °C)] 99 °F (37.2 °C)  Pulse:  [56-85] 59  Resp:  [16-20] 20  SpO2:  [87 %-97 %] 91 %  BP: (138-193)/(45-68) 155/58     Weight: 80 kg (176 lb 5.9 oz)  Body mass index is 27.62 kg/m².    Physical Exam  Constitutional:       Appearance: Normal appearance. He is normal weight. He is ill-appearing.   HENT:      Head: Normocephalic and atraumatic.      Left Ear: Tympanic membrane normal.      Nose: Congestion and rhinorrhea present.      Mouth/Throat:      Mouth: Mucous membranes are moist.      Pharynx: Oropharynx is clear. No oropharyngeal exudate.   Eyes:      Extraocular Movements: Extraocular movements intact.      Conjunctiva/sclera: Conjunctivae normal.      Pupils: Pupils are equal, round, and reactive to light.   Cardiovascular:      Rate and Rhythm: Normal rate and regular rhythm.      Pulses: Normal pulses.      Heart sounds: Normal heart sounds. No murmur heard.    No gallop.   Pulmonary:      Effort: Pulmonary effort is normal.      Breath sounds: Normal breath sounds.   Abdominal:      General: Abdomen is flat. Bowel sounds are normal. There is no distension.      Palpations: Abdomen is soft.   Genitourinary:     Penis: Normal.    Musculoskeletal:         General: Normal range of motion.      Cervical back: Normal range of motion and neck supple.      Right lower leg: No edema.   Skin:     General: Skin is warm and dry.      Comments: Erythematous rash to the buttocks   Neurological:      General: No focal deficit present.      Mental Status: He is alert and oriented to person, place, and time. Mental status is at baseline.      Cranial Nerves: No cranial nerve deficit.      Motor: No weakness.      Gait: Gait normal.   Psychiatric:         Mood and Affect: Mood normal.         Behavior: Behavior normal.         Thought Content: Thought content normal.         Judgment: Judgment normal.         CRANIAL NERVES     CN III, IV, VI   Pupils are equal,  round, and reactive to light.     Significant Labs: All pertinent labs within the past 24 hours have been reviewed.  CBC:   Recent Labs   Lab 04/27/23 0257 04/28/23  0443   WBC 7.8 7.3   HGB 6.9* 8.8*   HCT 22.8* 29.1*    125*       CMP:   Recent Labs   Lab 04/27/23 0257 04/28/23  0443   * 137   K 4.3 4.7   CO2 21* 17*   BUN 51.0* 36.0*   CREATININE 1.59* 1.47*   CALCIUM 7.8* 8.0*   ALBUMIN 2.5* 2.4*   BILITOT 0.1 0.2   ALKPHOS 59 53   AST 16 17   ALT 17 16         Significant Imaging: I have reviewed all pertinent imaging results/findings within the past 24 hours.

## 2023-04-28 NOTE — PLAN OF CARE
04/28/23 1535   Discharge Assessment   Assessment Type Discharge Planning Assessment   Source of Information patient   Do you expect to return to your current living situation? Yes   Prior to hospitilization cognitive status: Alert/Oriented;No Deficits   Current cognitive status: Alert/Oriented;No Deficits   Equipment Currently Used at Home none   Do you currently have service(s) that help you manage your care at home? No   Do you take prescription medications? Yes   Do you have prescription coverage? Yes   Do you have any problems affording any of your prescribed medications? No   Is the patient taking medications as prescribed? yes   Are you on dialysis? No   Do you take coumadin? No   Discharge Plan A Home Health   Discharge Plan B Skilled Nursing Facility   DME Needed Upon Discharge  none   Discharge Plan discussed with: Patient   Discharge Barriers Identified None   Physical Activity   On average, how many days per week do you engage in moderate to strenuous exercise (like a brisk walk)? Patient refu   On average, how many minutes do you engage in exercise at this level? Patient refu   Financial Resource Strain   How hard is it for you to pay for the very basics like food, housing, medical care, and heating? Patient refu   Housing Stability   In the last 12 months, was there a time when you were not able to pay the mortgage or rent on time? [place]   In the last 12 months, was there a time when you did not have a steady place to sleep or slept in a shelter (including now)? AK   Transportation Needs   In the past 12 months, has lack of transportation kept you from medical appointments or from getting medications? Patient refu   In the past 12 months, has lack of transportation kept you from meetings, work, or from getting things needed for daily living? Patient refu   Food Insecurity   Within the past 12 months, you worried that your food would run out before you got the money to buy more. Patient refu   Within  the past 12 months, the food you bought just didn't last and you didn't have money to get more. Patient refu   Stress   Do you feel stress - tense, restless, nervous, or anxious, or unable to sleep at night because your mind is troubled all the time - these days? Patient refu   Social Connections   In a typical week, how many times do you talk on the phone with family, friends, or neighbors? Patient refu   How often do you get together with friends or relatives? Patient refu   How often do you attend Adventist or Advent services? Patient refu   Do you belong to any clubs or organizations such as Adventist groups, unions, fraternal or athletic groups, or school groups? Patient refu   How often do you attend meetings of the clubs or organizations you belong to? Patient refu   Are you , , , , never , or living with a partner? Patient refu   Alcohol Use   Q1: How often do you have a drink containing alcohol? Patient refu   Q2: How many drinks containing alcohol do you have on a typical day when you are drinking? Patient refu   Q3: How often do you have six or more drinks on one occasion? Patient refu

## 2023-04-28 NOTE — ASSESSMENT & PLAN NOTE
contiunue iv fluids.   Gi pcr was collected in clinic pos for c-diff.    Follow cmp and h/h  Fluids  d5 /12 nds with 40 meq kcl at 100cc

## 2023-04-28 NOTE — PLAN OF CARE
Problem: Adult Inpatient Plan of Care  Goal: Plan of Care Review  Outcome: Ongoing, Progressing  Goal: Patient-Specific Goal (Individualized)  Outcome: Ongoing, Progressing  Goal: Absence of Hospital-Acquired Illness or Injury  Outcome: Ongoing, Progressing  Goal: Optimal Comfort and Wellbeing  Outcome: Ongoing, Progressing  Goal: Readiness for Transition of Care  Outcome: Ongoing, Progressing     Problem: Infection  Goal: Absence of Infection Signs and Symptoms  Outcome: Ongoing, Progressing     Problem: Skin Injury Risk Increased  Goal: Skin Health and Integrity  Outcome: Ongoing, Progressing     Problem: Fluid Volume Deficit  Goal: Fluid Balance  Outcome: Ongoing, Progressing     Problem: Fatigue  Goal: Improved Activity Tolerance  Outcome: Ongoing, Progressing     Problem: Anemia  Goal: Anemia Symptom Improvement  Outcome: Ongoing, Progressing

## 2023-04-29 LAB
ALBUMIN SERPL-MCNC: 2.2 G/DL (ref 3.4–4.8)
ALBUMIN/GLOB SERPL: 0.8 RATIO (ref 1.1–2)
ALP SERPL-CCNC: 49 UNIT/L (ref 40–150)
ALT SERPL-CCNC: 15 UNIT/L (ref 0–55)
AST SERPL-CCNC: 15 UNIT/L (ref 5–34)
BASOPHILS # BLD AUTO: 0.01 X10(3)/MCL (ref 0–0.2)
BASOPHILS NFR BLD AUTO: 0.1 %
BILIRUBIN DIRECT+TOT PNL SERPL-MCNC: 0.3 MG/DL
BUN SERPL-MCNC: 26 MG/DL (ref 8.4–25.7)
CALCIUM SERPL-MCNC: 8 MG/DL (ref 8.8–10)
CHLORIDE SERPL-SCNC: 113 MMOL/L (ref 98–107)
CO2 SERPL-SCNC: 17 MMOL/L (ref 23–31)
CREAT SERPL-MCNC: 1.48 MG/DL (ref 0.73–1.18)
EOSINOPHIL # BLD AUTO: 0.05 X10(3)/MCL (ref 0–0.9)
EOSINOPHIL NFR BLD AUTO: 0.7 %
ERYTHROCYTE [DISTWIDTH] IN BLOOD BY AUTOMATED COUNT: 14.9 % (ref 11.5–17)
GFR SERPLBLD CREATININE-BSD FMLA CKD-EPI: 47 MLS/MIN/1.73/M2
GLOBULIN SER-MCNC: 2.9 GM/DL (ref 2.4–3.5)
GLUCOSE SERPL-MCNC: 116 MG/DL (ref 82–115)
HCT VFR BLD AUTO: 26.1 % (ref 42–52)
HCT VFR BLD AUTO: 28.6 % (ref 42–52)
HCT VFR BLD AUTO: 29 % (ref 42–52)
HGB BLD-MCNC: 8 G/DL (ref 14–18)
HGB BLD-MCNC: 8.8 G/DL (ref 14–18)
HGB BLD-MCNC: 9.1 G/DL (ref 14–18)
IMM GRANULOCYTES # BLD AUTO: 0.07 X10(3)/MCL (ref 0–0.04)
IMM GRANULOCYTES NFR BLD AUTO: 1 %
LYMPHOCYTES # BLD AUTO: 0.98 X10(3)/MCL (ref 0.6–4.6)
LYMPHOCYTES NFR BLD AUTO: 14.5 %
MCH RBC QN AUTO: 26.8 PG (ref 27–31)
MCHC RBC AUTO-ENTMCNC: 30.7 G/DL (ref 33–36)
MCV RBC AUTO: 87.3 FL (ref 80–94)
MONOCYTES # BLD AUTO: 0.87 X10(3)/MCL (ref 0.1–1.3)
MONOCYTES NFR BLD AUTO: 12.9 %
NEUTROPHILS # BLD AUTO: 4.78 X10(3)/MCL (ref 2.1–9.2)
NEUTROPHILS NFR BLD AUTO: 70.8 %
PLATELET # BLD AUTO: 222 X10(3)/MCL (ref 130–400)
PMV BLD AUTO: 9.8 FL (ref 7.4–10.4)
POTASSIUM SERPL-SCNC: 4.8 MMOL/L (ref 3.5–5.1)
PROT SERPL-MCNC: 5.1 GM/DL (ref 5.8–7.6)
RBC # BLD AUTO: 2.99 X10(6)/MCL (ref 4.7–6.1)
SODIUM SERPL-SCNC: 137 MMOL/L (ref 136–145)
WBC # SPEC AUTO: 6.8 X10(3)/MCL (ref 4.5–11.5)

## 2023-04-29 PROCEDURE — 25000242 PHARM REV CODE 250 ALT 637 W/ HCPCS: Performed by: FAMILY MEDICINE

## 2023-04-29 PROCEDURE — 97161 PT EVAL LOW COMPLEX 20 MIN: CPT

## 2023-04-29 PROCEDURE — 63600175 PHARM REV CODE 636 W HCPCS: Performed by: SURGERY

## 2023-04-29 PROCEDURE — 63700000 PHARM REV CODE 250 ALT 637 W/O HCPCS: Performed by: FAMILY MEDICINE

## 2023-04-29 PROCEDURE — 94761 N-INVAS EAR/PLS OXIMETRY MLT: CPT

## 2023-04-29 PROCEDURE — 80053 COMPREHEN METABOLIC PANEL: CPT | Performed by: FAMILY MEDICINE

## 2023-04-29 PROCEDURE — 11000001 HC ACUTE MED/SURG PRIVATE ROOM

## 2023-04-29 PROCEDURE — 63600175 PHARM REV CODE 636 W HCPCS: Performed by: FAMILY MEDICINE

## 2023-04-29 PROCEDURE — A4216 STERILE WATER/SALINE, 10 ML: HCPCS | Performed by: FAMILY MEDICINE

## 2023-04-29 PROCEDURE — 27000207 HC ISOLATION

## 2023-04-29 PROCEDURE — 85025 COMPLETE CBC W/AUTO DIFF WBC: CPT | Performed by: FAMILY MEDICINE

## 2023-04-29 PROCEDURE — 25000003 PHARM REV CODE 250: Performed by: FAMILY MEDICINE

## 2023-04-29 PROCEDURE — 25000003 PHARM REV CODE 250: Performed by: SURGERY

## 2023-04-29 PROCEDURE — 94640 AIRWAY INHALATION TREATMENT: CPT

## 2023-04-29 PROCEDURE — C9113 INJ PANTOPRAZOLE SODIUM, VIA: HCPCS | Performed by: SURGERY

## 2023-04-29 PROCEDURE — 27000221 HC OXYGEN, UP TO 24 HOURS

## 2023-04-29 PROCEDURE — S0030 INJECTION, METRONIDAZOLE: HCPCS | Performed by: FAMILY MEDICINE

## 2023-04-29 PROCEDURE — 99900035 HC TECH TIME PER 15 MIN (STAT)

## 2023-04-29 PROCEDURE — 85014 HEMATOCRIT: CPT | Performed by: SURGERY

## 2023-04-29 RX ORDER — FUROSEMIDE 10 MG/ML
20 INJECTION INTRAMUSCULAR; INTRAVENOUS ONCE
Status: COMPLETED | OUTPATIENT
Start: 2023-04-29 | End: 2023-04-29

## 2023-04-29 RX ADMIN — OLANZAPINE 10 MG: 2.5 TABLET, FILM COATED ORAL at 09:04

## 2023-04-29 RX ADMIN — SODIUM CHLORIDE, PRESERVATIVE FREE 10 ML: 5 INJECTION INTRAVENOUS at 05:04

## 2023-04-29 RX ADMIN — MICONAZOLE NITRATE: 20 CREAM TOPICAL at 09:04

## 2023-04-29 RX ADMIN — PANTOPRAZOLE SODIUM 8 MG/HR: 40 INJECTION, POWDER, FOR SOLUTION INTRAVENOUS at 07:04

## 2023-04-29 RX ADMIN — POTASSIUM CHLORIDE, DEXTROSE MONOHYDRATE AND SODIUM CHLORIDE: 300; 5; 450 INJECTION, SOLUTION INTRAVENOUS at 09:04

## 2023-04-29 RX ADMIN — IPRATROPIUM BROMIDE AND ALBUTEROL SULFATE 3 ML: .5; 3 SOLUTION RESPIRATORY (INHALATION) at 09:04

## 2023-04-29 RX ADMIN — MUPIROCIN 1 G: 20 OINTMENT TOPICAL at 09:04

## 2023-04-29 RX ADMIN — AMLODIPINE BESYLATE 10 MG: 10 TABLET ORAL at 09:04

## 2023-04-29 RX ADMIN — METRONIDAZOLE 500 MG: 5 INJECTION, SOLUTION INTRAVENOUS at 10:04

## 2023-04-29 RX ADMIN — FUROSEMIDE 20 MG: 10 INJECTION, SOLUTION INTRAMUSCULAR; INTRAVENOUS at 11:04

## 2023-04-29 RX ADMIN — Medication 250 MG: at 09:04

## 2023-04-29 RX ADMIN — TAMSULOSIN HYDROCHLORIDE 0.4 MG: 0.4 CAPSULE ORAL at 09:04

## 2023-04-29 RX ADMIN — SUCRALFATE ORAL 1 G: 1 SUSPENSION ORAL at 02:04

## 2023-04-29 RX ADMIN — SUCRALFATE ORAL 1 G: 1 SUSPENSION ORAL at 09:04

## 2023-04-29 RX ADMIN — METRONIDAZOLE 500 MG: 5 INJECTION, SOLUTION INTRAVENOUS at 06:04

## 2023-04-29 RX ADMIN — PANTOPRAZOLE SODIUM 8 MG/HR: 40 INJECTION, POWDER, FOR SOLUTION INTRAVENOUS at 09:04

## 2023-04-29 RX ADMIN — METRONIDAZOLE 500 MG: 5 INJECTION, SOLUTION INTRAVENOUS at 02:04

## 2023-04-29 RX ADMIN — HYDRALAZINE HYDROCHLORIDE 10 MG: 20 INJECTION INTRAMUSCULAR; INTRAVENOUS at 06:04

## 2023-04-29 RX ADMIN — HYDROCHLOROTHIAZIDE 25 MG: 25 TABLET ORAL at 09:04

## 2023-04-29 RX ADMIN — FLUCONAZOLE 100 MG: 100 TABLET ORAL at 09:04

## 2023-04-29 RX ADMIN — CETIRIZINE HYDROCHLORIDE 10 MG: 10 TABLET, FILM COATED ORAL at 09:04

## 2023-04-29 RX ADMIN — HYDRALAZINE HYDROCHLORIDE 25 MG: 25 TABLET, FILM COATED ORAL at 09:04

## 2023-04-29 RX ADMIN — NEBIVOLOL 10 MG: 5 TABLET ORAL at 09:04

## 2023-04-29 RX ADMIN — LEVOFLOXACIN 500 MG: 500 INJECTION, SOLUTION INTRAVENOUS at 07:04

## 2023-04-29 RX ADMIN — PANTOPRAZOLE SODIUM 8 MG/HR: 40 INJECTION, POWDER, FOR SOLUTION INTRAVENOUS at 02:04

## 2023-04-29 RX ADMIN — POTASSIUM CHLORIDE, DEXTROSE MONOHYDRATE AND SODIUM CHLORIDE: 300; 5; 450 INJECTION, SOLUTION INTRAVENOUS at 12:04

## 2023-04-29 RX ADMIN — NEBIVOLOL 10 MG: 5 TABLET ORAL at 10:04

## 2023-04-29 RX ADMIN — SIMETHICONE 80 MG: 80 TABLET, CHEWABLE ORAL at 02:04

## 2023-04-29 RX ADMIN — POTASSIUM CHLORIDE, DEXTROSE MONOHYDRATE AND SODIUM CHLORIDE: 300; 5; 450 INJECTION, SOLUTION INTRAVENOUS at 03:04

## 2023-04-29 RX ADMIN — FERROUS GLUCONATE 324 MG: 324 TABLET ORAL at 09:04

## 2023-04-29 RX ADMIN — SPIRONOLACTONE 25 MG: 25 TABLET ORAL at 09:04

## 2023-04-29 NOTE — PT/OT/SLP EVAL
Physical Therapy Evaluation    Patient Name:  Claus Mcguire   MRN:  [id number]    Recommendations:     Discharge Recommendations:     Discharge Equipment Recommendations:     Barriers to discharge:  medical status    Assessment:     Claus Mcguire is a 82 y.o. male admitted with a medical diagnosis of Dehydration.  He presents with the following impairments/functional limitations: weakness, impaired endurance, impaired functional mobility, gait instability, impaired balance .    Rehab Prognosis: Good; patient would benefit from acute skilled PT services to address these deficits and reach maximum level of function.    Recent Surgery: Procedure(s) (LRB):  EGD (ESOPHAGOGASTRODUODENOSCOPY) (N/A) 2 Days Post-Op    Plan:     During this hospitalization, patient to be seen daily to address the identified rehab impairments via gait training, therapeutic activities, therapeutic exercises and progress toward the following goals:    Plan of Care Expires:       Subjective     Chief Complaint: weak  Patient/Family Comments/goals: increase I with gait to return home  Pain/Comfort:       Patients cultural, spiritual, Yarsani conflicts given the current situation:      Living Environment:  With family  Prior to admission, patients level of function was I with mobility.  Equipment used at home:  .  DME owned (not currently used): none.  Upon discharge, patient will have assistance from family.    Objective:     Communicated with spouse prior to session.  Patient found supine with PureWick, peripheral IV, PICC line  upon PT entry to room.    General Precautions: Standard, fall, contact  Orthopedic Precautions:    Braces:    Respiratory Status: Room air    Exams:  RUE Strength: WFL  LUE Strength: WFL  RLE Strength: WFL  LLE Strength: WFL    Functional Mobility:  Bed Mobility:     Supine to Sit: moderate assistance  Transfers:     Sit to Stand:  moderate assistance with standard walker  Gait: 20' modA RW, cues for  guidance      AM-PAC 6 CLICK MOBILITY  Total Score:        Treatment & Education:  Bed mobility, transfers, gait    Patient left HOB elevated with all lines intact, call button in reach, and family present.    GOALS:   Multidisciplinary Problems       Physical Therapy Goals          Problem: Physical Therapy    Goal Priority Disciplines Outcome Goal Variances Interventions   Physical Therapy Goal     PT, PT/OT Ongoing, Progressing                         History:     Past Medical History:   Diagnosis Date    Hypertension        Past Surgical History:   Procedure Laterality Date    ESOPHAGOGASTRODUODENOSCOPY N/A 4/27/2023    Procedure: EGD (ESOPHAGOGASTRODUODENOSCOPY);  Surgeon: Lisa Ramesh MD;  Location: [facility] [place];  Service: Endoscopy;  Laterality: N/A;  ULCER IN 1ST PORTION OF DUEDENUM, NO ACTIVE BLEEDING    SKIN GRAFT Left        Time Tracking:     PT Received On: 04/29/23  PT Start Time: 1030     PT Stop Time: 1050  PT Total Time (min): 20 min     Billable Minutes: Evaluation 20 04/29/2023

## 2023-04-29 NOTE — PROGRESS NOTES
Progress Note    Admit Date: 4/26/2023   LOS: 2 days     SUBJECTIVE:     Follow-up For:  gastric ulcer.  He had a better night last night.  No further diarrhea noted. H/H has been stable.     Scheduled Meds:   amLODIPine  10 mg Oral QHS    ascorbic acid (vitamin C)  250 mg Oral Daily    cetirizine  10 mg Oral Daily    cloNIDine 0.1 mg/24 hr td ptwk  1 patch Transdermal Q7 Days    ferrous gluconate  324 mg Oral Daily with breakfast    fluconazole  100 mg Oral Daily    hydrALAZINE  25 mg Oral BID    spironolactone  25 mg Oral Daily    And    hydroCHLOROthiazide  25 mg Oral Daily    levoFLOXacin  500 mg Intravenous Q24H    metronidazole  500 mg Intravenous Q8H    miconazole   Topical (Top) BID    mupirocin   Nasal BID    nebivoloL  10 mg Oral BID    OLANZapine  10 mg Oral QHS    sodium chloride 0.9%  10 mL Intravenous Q6H    sucralfate  1 g Oral TID    tamsulosin  1 capsule Oral QHS     Continuous Infusions:   dextrose 5 % and 0.45 % NaCl with KCl 40 mEq 100 mL/hr at 04/29/23 1500    pantoprazole (PROTONIX) IV infusion 8 mg/hr (04/29/23 0738)     PRN Meds:sodium chloride, acetaminophen, acetaminophen, albuterol-ipratropium, hydrALAZINE, morphine, ondansetron, simethicone, Flushing PICC Protocol **AND** sodium chloride 0.9% **AND** sodium chloride 0.9%, sodium chloride 0.9%    Review of patient's allergies indicates:   Allergen Reactions    Penicillins Swelling       Review of Systems  ROS    OBJECTIVE:     Vital Signs (Most Recent)  Temp: 98.2 °F (36.8 °C) (04/29/23 1222)  Pulse: 64 (04/29/23 1516)  Resp: 16 (04/29/23 1516)  BP: (!) 173/56 (04/29/23 1222)  SpO2: 96 % (04/29/23 1516)    Vital Signs Range (Last 24H):  Temp:  [97.4 °F (36.3 °C)-98.4 °F (36.9 °C)]   Pulse:  [42-64]   Resp:  [16-20]   BP: (144-173)/(54-61)   SpO2:  [92 %-96 %]     I & O (Last 24H):  Intake/Output Summary (Last 24 hours) at 4/29/2023 1621  Last data filed at 4/29/2023 1200  Gross per 24 hour   Intake 120 ml   Output 1100 ml   Net -980 ml      Physical Exam:  Physical Exam   Constitutional: He is oriented to person, place, and time. He appears well-developed and well-nourished. No distress.   HENT:   Head: Normocephalic.   Right Ear: External ear normal.   Left Ear: External ear normal.   Eyes: Pupils are equal, round, and reactive to light.   Neck: No tracheal deviation present. No thyromegaly present.   Cardiovascular: Normal rate, regular rhythm and normal heart sounds. Exam reveals no friction rub.   No murmur heard.Pulmonary:      Effort: Pulmonary effort is normal.      Breath sounds: Normal breath sounds.     Abdominal: He exhibits distension. He exhibits no mass. There is no rebound and no guarding.   Musculoskeletal:         General: No tenderness or deformity.   Neurological: He is alert and oriented to person, place, and time. No cranial nerve deficit. He exhibits normal muscle tone. Coordination normal.   Skin: Skin is warm and dry. Capillary refill takes less than 2 seconds. He is not diaphoretic. No erythema.   Psychiatric: His behavior is normal. Judgment and thought content normal.      Laboratory:  Recent Results (from the past 24 hour(s))   Hemoglobin and Hematocrit    Collection Time: 04/28/23  7:01 PM   Result Value Ref Range    Hgb 8.2 (L) 14.0 - 18.0 g/dL    Hct 26.4 (L) 42.0 - 52.0 %   Hemoglobin and Hematocrit    Collection Time: 04/28/23 11:28 PM   Result Value Ref Range    Hgb 8.3 (L) 14.0 - 18.0 g/dL    Hct 26.4 (L) 42.0 - 52.0 %   Comprehensive Metabolic Panel    Collection Time: 04/29/23  3:42 AM   Result Value Ref Range    Sodium Level 137 136 - 145 mmol/L    Potassium Level 4.8 3.5 - 5.1 mmol/L    Chloride 113 (H) 98 - 107 mmol/L    Carbon Dioxide 17 (L) 23 - 31 mmol/L    Glucose Level 116 (H) 82 - 115 mg/dL    Blood Urea Nitrogen 26.0 (H) 8.4 - 25.7 mg/dL    Creatinine 1.48 (H) 0.73 - 1.18 mg/dL    Calcium Level Total 8.0 (L) 8.8 - 10.0 mg/dL    Protein Total 5.1 (L) 5.8 - 7.6 gm/dL    Albumin Level 2.2 (L) 3.4 - 4.8  g/dL    Globulin 2.9 2.4 - 3.5 gm/dL    Albumin/Globulin Ratio 0.8 (L) 1.1 - 2.0 ratio    Bilirubin Total 0.3 <=1.5 mg/dL    Alkaline Phosphatase 49 40 - 150 unit/L    Alanine Aminotransferase 15 0 - 55 unit/L    Aspartate Aminotransferase 15 5 - 34 unit/L    eGFR 47 mls/min/1.73/m2   CBC with Differential    Collection Time: 04/29/23  3:42 AM   Result Value Ref Range    WBC 6.8 4.5 - 11.5 x10(3)/mcL    RBC 2.99 (L) 4.70 - 6.10 x10(6)/mcL    Hgb 8.0 (L) 14.0 - 18.0 g/dL    Hct 26.1 (L) 42.0 - 52.0 %    MCV 87.3 80.0 - 94.0 fL    MCH 26.8 (L) 27.0 - 31.0 pg    MCHC 30.7 (L) 33.0 - 36.0 g/dL    RDW 14.9 11.5 - 17.0 %    Platelet 222 130 - 400 x10(3)/mcL    MPV 9.8 7.4 - 10.4 fL    Neut % 70.8 %    Lymph % 14.5 %    Mono % 12.9 %    Eos % 0.7 %    Basophil % 0.1 %    Lymph # 0.98 0.6 - 4.6 x10(3)/mcL    Neut # 4.78 2.1 - 9.2 x10(3)/mcL    Mono # 0.87 0.1 - 1.3 x10(3)/mcL    Eos # 0.05 0 - 0.9 x10(3)/mcL    Baso # 0.01 0 - 0.2 x10(3)/mcL    IG# 0.07 (H) 0 - 0.04 x10(3)/mcL    IG% 1.0 %   Hemoglobin and Hematocrit    Collection Time: 04/29/23 11:59 AM   Result Value Ref Range    Hgb 8.8 (L) 14.0 - 18.0 g/dL    Hct 28.6 (L) 42.0 - 52.0 %        Diagnostic Results:  CT Abdomen Pelvis  Without Contrast    Result Date: 4/27/2023  EXAMINATION: CT ABDOMEN PELVIS WITHOUT CONTRAST CLINICAL HISTORY: GI bleed;Nausea/vomiting;, . TECHNIQUE: PATIENT RADIATION DOSE: DLP(mGycm) 343 As per PQRS measures, all CT scans at this facility used dose modulation, iterative reconstruction, and/or weight based dose adjustment when appropriate to reduce radiation dose to as low as reasonably achievable. COMPARISON: None available. FINDINGS: Serial axial images were obtained of the abdomen pelvis without the administration of IV contrast.  Additional sagittal and coronal reconstructions were performed. Degenerative changes are noted to the thoracolumbar spine.  The heart is borderline enlarged.  There is a patchy wedge-shaped density at the  posterior right lower lung.  Scattered patchy density is evident at the right lung base and posterior left lung base.  The liver, gallbladder, spleen, and adrenal glands are grossly within normal limits without the administration of IV contrast.  There is haziness/stranding surrounding the pancreatic head and uncinate process as well as the duodenal sweep.  There is mucosal thickening at the duodenum.  There is mucosal prominence versus underdistention at the stomach.  There is mild mucosal prominence at a small hiatus hernia.  The kidneys are relatively symmetric in size.  Round low-attenuation foci are evident at the kidneys bilaterally suspicious for cysts with the largest on the right measuring up to 8.5 cm in diameter.  There is bilateral perinephric stranding.  No hydronephrosis is seen.  A few mildly prominent lymph nodes seen within the pericaval/periportal region.  No dilated loops of bowel are identified.  The appendix is within normal limits.  Gas and feces are seen within the colon.  Scattered diverticula are noted to the descending and sigmoid colon.  The bladder is partially distended with fluid.  The prostate is mildly enlarged.  Small fatty inguinal hernias are evident bilaterally.  No significant free fluid collection is seen.  Atherosclerosis is seen within the aorta and branching vessels     1. Circumferential mucosal thickening at the distal stomach and duodenum with the diffuse surrounding edema the duodenum and pancreatic head/uncinate process suspicious for a pancreatitis and duodenitis.  Given the patient's history of a GI bleed; a ulcer at the distal stomach or duodenum must be considered. 2. Wedge-shaped patchy density posterior right lower lung with scattered patchy densities at the right lung base and posterior left lung base suspicious for infiltrate and atelectasis 3. Round low-attenuation foci at the kidneys bilaterally suggesting cyst.  A renal sonogram would allow further evaluation  4. Mild mucosal thickening at a small hiatus hernia 5. Mucosal prominence versus underdistention at the stomach 6. Diverticulosis coli 7. Atherosclerosis 8. Small fatty inguinal hernias bilaterally 9. Borderline cardiomegaly 10. Mild prostatomegaly 11. The referring physician was notified of the findings on 04/27/2023 at 08:45. Electronically signed by: Adan Rankin Date:    04/27/2023 Time:    08:38       ASSESSMENT/PLAN:       Plan:   Patient Active Problem List    Diagnosis Date Noted    Chronic gastric ulcer with hemorrhage 04/28/2023    Agitation 04/28/2023    Infiltrate noted on imaging study 04/28/2023    Gastrointestinal hemorrhage associated with anorectal source 04/27/2023    Acute blood loss anemia 04/27/2023    Duodenal ulcer 04/27/2023    Clostridium difficile infection 04/27/2023    Dehydration 04/26/2023    Diarrhea of presumed infectious origin 04/26/2023    Other specified anemias 04/26/2023    Weakness 04/26/2023    Essential hypertension 04/26/2023      Start on clear liquids  Continue to monitor serial h/h

## 2023-04-29 NOTE — PLAN OF CARE
Goals to be met by: 23     Patient will increase functional independence with mobility by performin. Supine to sit with Modified Holt  2. Sit to stand transfer with Modified Holt  3. Gait  x 150 feet with Stand-by Assistance using Rolling Walker.

## 2023-04-30 LAB
ALBUMIN SERPL-MCNC: 2.3 G/DL (ref 3.4–4.8)
ALBUMIN/GLOB SERPL: 0.7 RATIO (ref 1.1–2)
ALP SERPL-CCNC: 55 UNIT/L (ref 40–150)
ALT SERPL-CCNC: 14 UNIT/L (ref 0–55)
AST SERPL-CCNC: 19 UNIT/L (ref 5–34)
BASOPHILS # BLD AUTO: 0.02 X10(3)/MCL (ref 0–0.2)
BASOPHILS NFR BLD AUTO: 0.3 %
BILIRUBIN DIRECT+TOT PNL SERPL-MCNC: 0.3 MG/DL
BUN SERPL-MCNC: 21 MG/DL (ref 8.4–25.7)
CALCIUM SERPL-MCNC: 8.5 MG/DL (ref 8.8–10)
CHLORIDE SERPL-SCNC: 108 MMOL/L (ref 98–107)
CO2 SERPL-SCNC: 19 MMOL/L (ref 23–31)
CREAT SERPL-MCNC: 1.5 MG/DL (ref 0.73–1.18)
EOSINOPHIL # BLD AUTO: 0.04 X10(3)/MCL (ref 0–0.9)
EOSINOPHIL NFR BLD AUTO: 0.7 %
ERYTHROCYTE [DISTWIDTH] IN BLOOD BY AUTOMATED COUNT: 14.6 % (ref 11.5–17)
GFR SERPLBLD CREATININE-BSD FMLA CKD-EPI: 46 MLS/MIN/1.73/M2
GLOBULIN SER-MCNC: 3.3 GM/DL (ref 2.4–3.5)
GLUCOSE SERPL-MCNC: 109 MG/DL (ref 82–115)
HCT VFR BLD AUTO: 28.5 % (ref 42–52)
HGB BLD-MCNC: 8.7 G/DL (ref 14–18)
IMM GRANULOCYTES # BLD AUTO: 0.07 X10(3)/MCL (ref 0–0.04)
IMM GRANULOCYTES NFR BLD AUTO: 1.2 %
LYMPHOCYTES # BLD AUTO: 1.1 X10(3)/MCL (ref 0.6–4.6)
LYMPHOCYTES NFR BLD AUTO: 18.1 %
MAGNESIUM SERPL-MCNC: 1.8 MG/DL (ref 1.6–2.6)
MCH RBC QN AUTO: 26.6 PG (ref 27–31)
MCHC RBC AUTO-ENTMCNC: 30.5 G/DL (ref 33–36)
MCV RBC AUTO: 87.2 FL (ref 80–94)
MONOCYTES # BLD AUTO: 0.94 X10(3)/MCL (ref 0.1–1.3)
MONOCYTES NFR BLD AUTO: 15.5 %
NEUTROPHILS # BLD AUTO: 3.9 X10(3)/MCL (ref 2.1–9.2)
NEUTROPHILS NFR BLD AUTO: 64.2 %
PHOSPHATE SERPL-MCNC: 3.2 MG/DL (ref 2.3–4.7)
PLATELET # BLD AUTO: 243 X10(3)/MCL (ref 130–400)
PMV BLD AUTO: 9.7 FL (ref 7.4–10.4)
POTASSIUM SERPL-SCNC: 5 MMOL/L (ref 3.5–5.1)
PROT SERPL-MCNC: 5.6 GM/DL (ref 5.8–7.6)
RBC # BLD AUTO: 3.27 X10(6)/MCL (ref 4.7–6.1)
SODIUM SERPL-SCNC: 134 MMOL/L (ref 136–145)
WBC # SPEC AUTO: 6.1 X10(3)/MCL (ref 4.5–11.5)

## 2023-04-30 PROCEDURE — 94640 AIRWAY INHALATION TREATMENT: CPT

## 2023-04-30 PROCEDURE — 85025 COMPLETE CBC W/AUTO DIFF WBC: CPT | Performed by: FAMILY MEDICINE

## 2023-04-30 PROCEDURE — 99900035 HC TECH TIME PER 15 MIN (STAT)

## 2023-04-30 PROCEDURE — 63700000 PHARM REV CODE 250 ALT 637 W/O HCPCS: Performed by: FAMILY MEDICINE

## 2023-04-30 PROCEDURE — 63600175 PHARM REV CODE 636 W HCPCS: Performed by: SURGERY

## 2023-04-30 PROCEDURE — 84100 ASSAY OF PHOSPHORUS: CPT | Performed by: FAMILY MEDICINE

## 2023-04-30 PROCEDURE — 27000207 HC ISOLATION

## 2023-04-30 PROCEDURE — A4216 STERILE WATER/SALINE, 10 ML: HCPCS | Performed by: FAMILY MEDICINE

## 2023-04-30 PROCEDURE — 94761 N-INVAS EAR/PLS OXIMETRY MLT: CPT

## 2023-04-30 PROCEDURE — S0030 INJECTION, METRONIDAZOLE: HCPCS | Performed by: FAMILY MEDICINE

## 2023-04-30 PROCEDURE — 27000221 HC OXYGEN, UP TO 24 HOURS

## 2023-04-30 PROCEDURE — 97530 THERAPEUTIC ACTIVITIES: CPT

## 2023-04-30 PROCEDURE — 25000003 PHARM REV CODE 250: Performed by: SURGERY

## 2023-04-30 PROCEDURE — 80053 COMPREHEN METABOLIC PANEL: CPT | Performed by: FAMILY MEDICINE

## 2023-04-30 PROCEDURE — 94799 UNLISTED PULMONARY SVC/PX: CPT

## 2023-04-30 PROCEDURE — 83735 ASSAY OF MAGNESIUM: CPT | Performed by: FAMILY MEDICINE

## 2023-04-30 PROCEDURE — 25000242 PHARM REV CODE 250 ALT 637 W/ HCPCS: Performed by: FAMILY MEDICINE

## 2023-04-30 PROCEDURE — 63600175 PHARM REV CODE 636 W HCPCS: Performed by: FAMILY MEDICINE

## 2023-04-30 PROCEDURE — 25000003 PHARM REV CODE 250: Performed by: FAMILY MEDICINE

## 2023-04-30 PROCEDURE — 11000001 HC ACUTE MED/SURG PRIVATE ROOM

## 2023-04-30 PROCEDURE — C9113 INJ PANTOPRAZOLE SODIUM, VIA: HCPCS | Performed by: SURGERY

## 2023-04-30 RX ADMIN — METRONIDAZOLE 500 MG: 5 INJECTION, SOLUTION INTRAVENOUS at 09:04

## 2023-04-30 RX ADMIN — SPIRONOLACTONE 25 MG: 25 TABLET ORAL at 09:04

## 2023-04-30 RX ADMIN — FLUCONAZOLE 100 MG: 100 TABLET ORAL at 09:04

## 2023-04-30 RX ADMIN — NEBIVOLOL 10 MG: 5 TABLET ORAL at 09:04

## 2023-04-30 RX ADMIN — HYDROCHLOROTHIAZIDE 25 MG: 25 TABLET ORAL at 09:04

## 2023-04-30 RX ADMIN — PANTOPRAZOLE SODIUM 8 MG/HR: 40 INJECTION, POWDER, FOR SOLUTION INTRAVENOUS at 06:04

## 2023-04-30 RX ADMIN — OLANZAPINE 10 MG: 2.5 TABLET, FILM COATED ORAL at 09:04

## 2023-04-30 RX ADMIN — IPRATROPIUM BROMIDE AND ALBUTEROL SULFATE 3 ML: .5; 3 SOLUTION RESPIRATORY (INHALATION) at 07:04

## 2023-04-30 RX ADMIN — FERROUS GLUCONATE 324 MG: 324 TABLET ORAL at 07:04

## 2023-04-30 RX ADMIN — SODIUM CHLORIDE, PRESERVATIVE FREE 10 ML: 5 INJECTION INTRAVENOUS at 05:04

## 2023-04-30 RX ADMIN — CETIRIZINE HYDROCHLORIDE 10 MG: 10 TABLET, FILM COATED ORAL at 09:04

## 2023-04-30 RX ADMIN — PANTOPRAZOLE SODIUM 8 MG/HR: 40 INJECTION, POWDER, FOR SOLUTION INTRAVENOUS at 12:04

## 2023-04-30 RX ADMIN — SUCRALFATE ORAL 1 G: 1 SUSPENSION ORAL at 02:04

## 2023-04-30 RX ADMIN — SODIUM CHLORIDE, PRESERVATIVE FREE 10 ML: 5 INJECTION INTRAVENOUS at 06:04

## 2023-04-30 RX ADMIN — HYDRALAZINE HYDROCHLORIDE 10 MG: 20 INJECTION INTRAMUSCULAR; INTRAVENOUS at 03:04

## 2023-04-30 RX ADMIN — HYDRALAZINE HYDROCHLORIDE 25 MG: 25 TABLET, FILM COATED ORAL at 09:04

## 2023-04-30 RX ADMIN — METRONIDAZOLE 500 MG: 5 INJECTION, SOLUTION INTRAVENOUS at 06:04

## 2023-04-30 RX ADMIN — METRONIDAZOLE 500 MG: 5 INJECTION, SOLUTION INTRAVENOUS at 01:04

## 2023-04-30 RX ADMIN — MICONAZOLE NITRATE: 20 CREAM TOPICAL at 09:04

## 2023-04-30 RX ADMIN — SODIUM CHLORIDE, PRESERVATIVE FREE 10 ML: 5 INJECTION INTRAVENOUS at 12:04

## 2023-04-30 RX ADMIN — TAMSULOSIN HYDROCHLORIDE 0.4 MG: 0.4 CAPSULE ORAL at 09:04

## 2023-04-30 RX ADMIN — SUCRALFATE ORAL 1 G: 1 SUSPENSION ORAL at 09:04

## 2023-04-30 RX ADMIN — LEVOFLOXACIN 500 MG: 500 INJECTION, SOLUTION INTRAVENOUS at 08:04

## 2023-04-30 RX ADMIN — Medication 250 MG: at 09:04

## 2023-04-30 RX ADMIN — AMLODIPINE BESYLATE 10 MG: 10 TABLET ORAL at 09:04

## 2023-04-30 RX ADMIN — MUPIROCIN 1 G: 20 OINTMENT TOPICAL at 09:04

## 2023-04-30 NOTE — NURSING
Patient and family requesting CM consult for SNF placement, Eric or Jaylen will pass along to Dr. Lopez for further orders.

## 2023-04-30 NOTE — PROGRESS NOTES
"Progress Note    Admit Date: 4/26/2023   LOS: 3 days     SUBJECTIVE:     Follow-up For:  gastric ulcer.  He had a better night last night.  No further diarrhea noted. H/H has been stable.     Overnight I was contacted by nurse Bhanu.  She mentioned that pt looked like "he was just grouchy  and was breathing fast."   O 2 sats were reportedly 99% on 2 L, RR was 28, otherwise his vitals were stable from 3 hrs prior according to her report.  I asked her to get fresh vitals and have charge RN also evaluate the pt. Respiratory apparently gave a neb tx without improvement so I ordered a stat chest xray and to give 1 dose of lasix 20 mg IV.      He was started on clear liquids yesterday.      Scheduled Meds:   amLODIPine  10 mg Oral QHS    ascorbic acid (vitamin C)  250 mg Oral Daily    cetirizine  10 mg Oral Daily    cloNIDine 0.1 mg/24 hr td ptwk  1 patch Transdermal Q7 Days    ferrous gluconate  324 mg Oral Daily with breakfast    fluconazole  100 mg Oral Daily    hydrALAZINE  25 mg Oral BID    spironolactone  25 mg Oral Daily    And    hydroCHLOROthiazide  25 mg Oral Daily    levoFLOXacin  500 mg Intravenous Q24H    metronidazole  500 mg Intravenous Q8H    miconazole   Topical (Top) BID    mupirocin   Nasal BID    nebivoloL  10 mg Oral BID    OLANZapine  10 mg Oral QHS    sodium chloride 0.9%  10 mL Intravenous Q6H    sucralfate  1 g Oral TID    tamsulosin  1 capsule Oral QHS     Continuous Infusions:   dextrose 5 % and 0.45 % NaCl with KCl 40 mEq 100 mL/hr at 04/29/23 2136    pantoprazole (PROTONIX) IV infusion 8 mg/hr (04/29/23 2117)     PRN Meds:sodium chloride, acetaminophen, acetaminophen, albuterol-ipratropium, hydrALAZINE, morphine, ondansetron, simethicone, Flushing PICC Protocol **AND** sodium chloride 0.9% **AND** sodium chloride 0.9%, sodium chloride 0.9%    Review of patient's allergies indicates:   Allergen Reactions    Penicillins Swelling       Review of Systems  ROS    OBJECTIVE:     Vital Signs (Most " Recent)  Temp: 99.6 °F (37.6 °C) (04/30/23 0749)  Pulse: (!) 58 (04/30/23 0749)  Resp: 18 (04/30/23 0745)  BP: (!) 152/64 (04/30/23 0749)  SpO2: 99 % (04/30/23 0749)    Vital Signs Range (Last 24H):  Temp:  [98.2 °F (36.8 °C)-99.6 °F (37.6 °C)]   Pulse:  [55-66]   Resp:  [16-28]   BP: (148-179)/(56-65)   SpO2:  [93 %-99 %]     I & O (Last 24H):  Intake/Output Summary (Last 24 hours) at 4/30/2023 0859  Last data filed at 4/30/2023 0717  Gross per 24 hour   Intake 360 ml   Output 1150 ml   Net -790 ml       Physical Exam:  Physical Exam   Constitutional: He is oriented to person, place, and time. He appears well-developed and well-nourished. No distress.   HENT:   Head: Normocephalic.   Right Ear: External ear normal.   Left Ear: External ear normal.   Eyes: Pupils are equal, round, and reactive to light.   Neck: No tracheal deviation present. No thyromegaly present.   Cardiovascular: Normal rate, regular rhythm and normal heart sounds. Exam reveals no friction rub.   No murmur heard.Pulmonary:      Effort: Pulmonary effort is normal.      Breath sounds: Normal breath sounds.     Abdominal: He exhibits distension. He exhibits no mass. There is no rebound and no guarding.   Musculoskeletal:         General: No tenderness or deformity.   Neurological: He is alert and oriented to person, place, and time. No cranial nerve deficit. He exhibits normal muscle tone. Coordination normal.   Skin: Skin is warm and dry. Capillary refill takes less than 2 seconds. He is not diaphoretic. No erythema.   Psychiatric: His behavior is normal. Judgment and thought content normal.      Laboratory:  Recent Results (from the past 24 hour(s))   Hemoglobin and Hematocrit    Collection Time: 04/29/23 11:59 AM   Result Value Ref Range    Hgb 8.8 (L) 14.0 - 18.0 g/dL    Hct 28.6 (L) 42.0 - 52.0 %   Hemoglobin and Hematocrit    Collection Time: 04/29/23  6:17 PM   Result Value Ref Range    Hgb 9.1 (L) 14.0 - 18.0 g/dL    Hct 29.0 (L) 42.0 - 52.0  %   Comprehensive Metabolic Panel    Collection Time: 04/30/23  3:34 AM   Result Value Ref Range    Sodium Level 134 (L) 136 - 145 mmol/L    Potassium Level 5.0 3.5 - 5.1 mmol/L    Chloride 108 (H) 98 - 107 mmol/L    Carbon Dioxide 19 (L) 23 - 31 mmol/L    Glucose Level 109 82 - 115 mg/dL    Blood Urea Nitrogen 21.0 8.4 - 25.7 mg/dL    Creatinine 1.50 (H) 0.73 - 1.18 mg/dL    Calcium Level Total 8.5 (L) 8.8 - 10.0 mg/dL    Protein Total 5.6 (L) 5.8 - 7.6 gm/dL    Albumin Level 2.3 (L) 3.4 - 4.8 g/dL    Globulin 3.3 2.4 - 3.5 gm/dL    Albumin/Globulin Ratio 0.7 (L) 1.1 - 2.0 ratio    Bilirubin Total 0.3 <=1.5 mg/dL    Alkaline Phosphatase 55 40 - 150 unit/L    Alanine Aminotransferase 14 0 - 55 unit/L    Aspartate Aminotransferase 19 5 - 34 unit/L    eGFR 46 mls/min/1.73/m2   Magnesium    Collection Time: 04/30/23  3:34 AM   Result Value Ref Range    Magnesium Level 1.80 1.60 - 2.60 mg/dL   Phosphorus    Collection Time: 04/30/23  3:34 AM   Result Value Ref Range    Phosphorus Level 3.2 2.3 - 4.7 mg/dL   CBC with Differential    Collection Time: 04/30/23  3:34 AM   Result Value Ref Range    WBC 6.1 4.5 - 11.5 x10(3)/mcL    RBC 3.27 (L) 4.70 - 6.10 x10(6)/mcL    Hgb 8.7 (L) 14.0 - 18.0 g/dL    Hct 28.5 (L) 42.0 - 52.0 %    MCV 87.2 80.0 - 94.0 fL    MCH 26.6 (L) 27.0 - 31.0 pg    MCHC 30.5 (L) 33.0 - 36.0 g/dL    RDW 14.6 11.5 - 17.0 %    Platelet 243 130 - 400 x10(3)/mcL    MPV 9.7 7.4 - 10.4 fL    Neut % 64.2 %    Lymph % 18.1 %    Mono % 15.5 %    Eos % 0.7 %    Basophil % 0.3 %    Lymph # 1.10 0.6 - 4.6 x10(3)/mcL    Neut # 3.90 2.1 - 9.2 x10(3)/mcL    Mono # 0.94 0.1 - 1.3 x10(3)/mcL    Eos # 0.04 0 - 0.9 x10(3)/mcL    Baso # 0.02 0 - 0.2 x10(3)/mcL    IG# 0.07 (H) 0 - 0.04 x10(3)/mcL    IG% 1.2 %        Diagnostic Results:  CT Abdomen Pelvis  Without Contrast    Result Date: 4/27/2023  EXAMINATION: CT ABDOMEN PELVIS WITHOUT CONTRAST CLINICAL HISTORY: GI bleed;Nausea/vomiting;, . TECHNIQUE: PATIENT RADIATION  DOSE: DLP(mGycm) 343 As per PQRS measures, all CT scans at this facility used dose modulation, iterative reconstruction, and/or weight based dose adjustment when appropriate to reduce radiation dose to as low as reasonably achievable. COMPARISON: None available. FINDINGS: Serial axial images were obtained of the abdomen pelvis without the administration of IV contrast.  Additional sagittal and coronal reconstructions were performed. Degenerative changes are noted to the thoracolumbar spine.  The heart is borderline enlarged.  There is a patchy wedge-shaped density at the posterior right lower lung.  Scattered patchy density is evident at the right lung base and posterior left lung base.  The liver, gallbladder, spleen, and adrenal glands are grossly within normal limits without the administration of IV contrast.  There is haziness/stranding surrounding the pancreatic head and uncinate process as well as the duodenal sweep.  There is mucosal thickening at the duodenum.  There is mucosal prominence versus underdistention at the stomach.  There is mild mucosal prominence at a small hiatus hernia.  The kidneys are relatively symmetric in size.  Round low-attenuation foci are evident at the kidneys bilaterally suspicious for cysts with the largest on the right measuring up to 8.5 cm in diameter.  There is bilateral perinephric stranding.  No hydronephrosis is seen.  A few mildly prominent lymph nodes seen within the pericaval/periportal region.  No dilated loops of bowel are identified.  The appendix is within normal limits.  Gas and feces are seen within the colon.  Scattered diverticula are noted to the descending and sigmoid colon.  The bladder is partially distended with fluid.  The prostate is mildly enlarged.  Small fatty inguinal hernias are evident bilaterally.  No significant free fluid collection is seen.  Atherosclerosis is seen within the aorta and branching vessels     1. Circumferential mucosal thickening  at the distal stomach and duodenum with the diffuse surrounding edema the duodenum and pancreatic head/uncinate process suspicious for a pancreatitis and duodenitis.  Given the patient's history of a GI bleed; a ulcer at the distal stomach or duodenum must be considered. 2. Wedge-shaped patchy density posterior right lower lung with scattered patchy densities at the right lung base and posterior left lung base suspicious for infiltrate and atelectasis 3. Round low-attenuation foci at the kidneys bilaterally suggesting cyst.  A renal sonogram would allow further evaluation 4. Mild mucosal thickening at a small hiatus hernia 5. Mucosal prominence versus underdistention at the stomach 6. Diverticulosis coli 7. Atherosclerosis 8. Small fatty inguinal hernias bilaterally 9. Borderline cardiomegaly 10. Mild prostatomegaly 11. The referring physician was notified of the findings on 04/27/2023 at 08:45. Electronically signed by: Adan Rankin Date:    04/27/2023 Time:    08:38       ASSESSMENT/PLAN:       Plan:   Patient Active Problem List    Diagnosis Date Noted    Chronic gastric ulcer with hemorrhage 04/28/2023    Agitation 04/28/2023    Infiltrate noted on imaging study 04/28/2023    Gastrointestinal hemorrhage associated with anorectal source 04/27/2023    Acute blood loss anemia 04/27/2023    Duodenal ulcer 04/27/2023    Clostridium difficile infection 04/27/2023    Dehydration 04/26/2023    Diarrhea of presumed infectious origin 04/26/2023    Other specified anemias 04/26/2023    Weakness 04/26/2023    Essential hypertension 04/26/2023      Continue on clear liquids- no red liquids  H/H stable, will d/c the q4 h/h  Wean off o2  Encourage up to chair/ ambulation.-cxr with atelectasis  Wiill d/c continuous fluids now that he is tolerating PO  Likely can dc tomorrow if can wean off o2 and maintain PO.  Will repeat labs in am.

## 2023-04-30 NOTE — PROGRESS NOTES
[facility]  Medical Surgical Unit  General Surgery  Progress Note    Subjective:     Interval History:  Status post EGD with closed monitoring for upper GI bleeding.  Patient's H&H stabilized over the last 72 hours with the decreasing BUN to creatinine ratio.  Patient has tolerated a liquid diet with planned for advancement this morning.  Overall has been treated appropriately with maximum acid suppression and Carafate therapy.  From a GI standpoint appears to be stable at this time.    Post-Op Info:  Procedure(s) (LRB):  EGD (ESOPHAGOGASTRODUODENOSCOPY) (N/A)   3 Days Post-Op      Medications:  Continuous Infusions:   pantoprazole (PROTONIX) IV infusion 8 mg/hr (04/29/23 2117)     Scheduled Meds:   amLODIPine  10 mg Oral QHS    ascorbic acid (vitamin C)  250 mg Oral Daily    cetirizine  10 mg Oral Daily    cloNIDine 0.1 mg/24 hr td ptwk  1 patch Transdermal Q7 Days    ferrous gluconate  324 mg Oral Daily with breakfast    fluconazole  100 mg Oral Daily    hydrALAZINE  25 mg Oral BID    spironolactone  25 mg Oral Daily    And    hydroCHLOROthiazide  25 mg Oral Daily    levoFLOXacin  500 mg Intravenous Q24H    metronidazole  500 mg Intravenous Q8H    miconazole   Topical (Top) BID    mupirocin   Nasal BID    nebivoloL  10 mg Oral BID    OLANZapine  10 mg Oral QHS    sodium chloride 0.9%  10 mL Intravenous Q6H    sucralfate  1 g Oral TID    tamsulosin  1 capsule Oral QHS     PRN Meds:sodium chloride, acetaminophen, acetaminophen, albuterol-ipratropium, hydrALAZINE, morphine, ondansetron, simethicone, Flushing PICC Protocol **AND** sodium chloride 0.9% **AND** sodium chloride 0.9%, sodium chloride 0.9%     Objective:     Vital Signs (Most Recent):  Temp: 99.6 °F (37.6 °C) (04/30/23 0749)  Pulse: (!) 58 (04/30/23 0749)  Resp: 18 (04/30/23 0745)  BP: (!) 152/64 (04/30/23 0749)  SpO2: 99 % (04/30/23 0749)   Vital Signs (24h Range):  Temp:  [98.2 °F (36.8 °C)-99.6 °F (37.6 °C)] 99.6 °F (37.6 °C)  Pulse:   [55-66] 58  Resp:  [16-28] 18  SpO2:  [93 %-99 %] 99 %  BP: (148-179)/(56-65) 152/64       Intake/Output Summary (Last 24 hours) at 4/30/2023 0941  Last data filed at 4/30/2023 0717  Gross per 24 hour   Intake 360 ml   Output 1150 ml   Net -790 ml       Physical Exam    Significant Labs:  CBC:   Recent Labs   Lab 04/30/23  0334   WBC 6.1   RBC 3.27*   HGB 8.7*   HCT 28.5*      MCV 87.2   MCH 26.6*   MCHC 30.5*     CMP:   Recent Labs   Lab 04/30/23  0334   CALCIUM 8.5*   ALBUMIN 2.3*   *   K 5.0   CO2 19*   BUN 21.0   CREATININE 1.50*   ALKPHOS 55   ALT 14   AST 19   BILITOT 0.3       Significant Diagnostics:  None    Assessment/Plan:     Active Diagnoses:    Diagnosis Date Noted POA    PRINCIPAL PROBLEM:  Dehydration [E86.0] 04/26/2023 Yes    Chronic gastric ulcer with hemorrhage [K25.4] 04/28/2023 Unknown    Agitation [R45.1] 04/28/2023 Unknown    Infiltrate noted on imaging study [R93.89] 04/28/2023 Unknown    Gastrointestinal hemorrhage associated with anorectal source [K62.5] 04/27/2023 Unknown    Acute blood loss anemia [D62] 04/27/2023 Clinically Undetermined     Chronic    Duodenal ulcer [K26.9] 04/27/2023 Yes     Chronic    Clostridium difficile infection [A49.8] 04/27/2023 Yes    Diarrhea of presumed infectious origin [R19.7] 04/26/2023 Yes    Other specified anemias [D64.89] 04/26/2023 Yes    Weakness [R53.1] 04/26/2023 Yes    Essential hypertension [I10] 04/26/2023 Yes      Problems Resolved During this Admission:     Continue with maximum acid suppression therapy until discharge.  Would recommend outpatient PPI therapy twice daily with avoidance of all NSAIDs alcohol and acidic foods and drinks.  Referral for outpatient interval EGD in 2-3 months.    Lisa Ramesh MD  General Surgery  [facility]  Medical Surgical Unit

## 2023-04-30 NOTE — NURSING
4/29/23 4485 Dr. Lopez notified about patient crackles and wheezing. Patient received neb treatment per Respiratory. Dr. Lopez order a chest xray and Lasix 20mg IV x1. Patient tolerated well.

## 2023-04-30 NOTE — PT/OT/SLP PROGRESS
Physical Therapy Treatment    Patient Name:  Claus Mcguire   MRN:  [id number]    Recommendations:     Discharge Recommendations:    Discharge Equipment Recommendations:    Barriers to discharge:  medical status    Assessment:     Claus Mcguire is a 82 y.o. male admitted with a medical diagnosis of Dehydration.  He presents with the following impairments/functional limitations: weakness, impaired endurance, impaired self care skills, impaired functional mobility, gait instability .    Rehab Prognosis: Good; patient would benefit from acute skilled PT services to address these deficits and reach maximum level of function.    Recent Surgery: Procedure(s) (LRB):  EGD (ESOPHAGOGASTRODUODENOSCOPY) (N/A) 3 Days Post-Op    Plan:     During this hospitalization, patient to be seen daily to address the identified rehab impairments via gait training, therapeutic activities, therapeutic exercises and progress toward the following goals:    Plan of Care Expires:       Subjective     Chief Complaint: weakness  Patient/Family Comments/goals: increase mobility  Pain/Comfort:         Objective:     Communicated with family prior to session.  Patient found supine with PureWick, PICC line upon PT entry to room.     General Precautions: Standard, contact  Orthopedic Precautions:    Braces:    Respiratory Status: Room air     Functional Mobility:  Bed Mobility:     Supine to Sit: moderate assistance  Transfers:     Sit to Stand:  moderate assistance with rolling walker  Gait: 20' mod A RW, cues for guidance      AM-PAC 6 CLICK MOBILITY          Treatment & Education:  Bed mobility, sitting balance EOB, transfers, gait with RW, standing tolerance to change wet diaper and gown    Patient left HOB elevated with all lines intact, call button in reach, NSG notified, and family present..    GOALS:   Multidisciplinary Problems       Physical Therapy Goals          Problem: Physical Therapy    Goal Priority Disciplines Outcome Goal Variances  Interventions   Physical Therapy Goal     PT, PT/OT Ongoing, Progressing                         Time Tracking:     PT Received On: 04/30/23  PT Start Time: 1030     PT Stop Time: 1050  PT Total Time (min): 20 min     Billable Minutes: Therapeutic Activity 20    Treatment Type: Treatment  PT/PTA: PT           04/30/2023

## 2023-05-01 LAB
ALBUMIN SERPL-MCNC: 2.2 G/DL (ref 3.4–4.8)
ALBUMIN/GLOB SERPL: 0.7 RATIO (ref 1.1–2)
ALP SERPL-CCNC: 50 UNIT/L (ref 40–150)
ALT SERPL-CCNC: 17 UNIT/L (ref 0–55)
AST SERPL-CCNC: 23 UNIT/L (ref 5–34)
BASOPHILS # BLD AUTO: 0.01 X10(3)/MCL (ref 0–0.2)
BASOPHILS NFR BLD AUTO: 0.2 %
BILIRUBIN DIRECT+TOT PNL SERPL-MCNC: 0.3 MG/DL
BUN SERPL-MCNC: 30 MG/DL (ref 8.4–25.7)
CALCIUM SERPL-MCNC: 8.2 MG/DL (ref 8.8–10)
CHLORIDE SERPL-SCNC: 109 MMOL/L (ref 98–107)
CO2 SERPL-SCNC: 16 MMOL/L (ref 23–31)
CREAT SERPL-MCNC: 1.69 MG/DL (ref 0.73–1.18)
EOSINOPHIL # BLD AUTO: 0.07 X10(3)/MCL (ref 0–0.9)
EOSINOPHIL NFR BLD AUTO: 1.2 %
ERYTHROCYTE [DISTWIDTH] IN BLOOD BY AUTOMATED COUNT: 14.7 % (ref 11.5–17)
GFR SERPLBLD CREATININE-BSD FMLA CKD-EPI: 40 MLS/MIN/1.73/M2
GLOBULIN SER-MCNC: 3.1 GM/DL (ref 2.4–3.5)
GLUCOSE SERPL-MCNC: 110 MG/DL (ref 82–115)
HCT VFR BLD AUTO: 27.6 % (ref 42–52)
HGB BLD-MCNC: 8.3 G/DL (ref 14–18)
IMM GRANULOCYTES # BLD AUTO: 0.07 X10(3)/MCL (ref 0–0.04)
IMM GRANULOCYTES NFR BLD AUTO: 1.2 %
LYMPHOCYTES # BLD AUTO: 1.01 X10(3)/MCL (ref 0.6–4.6)
LYMPHOCYTES NFR BLD AUTO: 17 %
MAGNESIUM SERPL-MCNC: 1.9 MG/DL (ref 1.6–2.6)
MCH RBC QN AUTO: 26.9 PG (ref 27–31)
MCHC RBC AUTO-ENTMCNC: 30.1 G/DL (ref 33–36)
MCV RBC AUTO: 89.6 FL (ref 80–94)
MONOCYTES # BLD AUTO: 0.81 X10(3)/MCL (ref 0.1–1.3)
MONOCYTES NFR BLD AUTO: 13.7 %
NEUTROPHILS # BLD AUTO: 3.96 X10(3)/MCL (ref 2.1–9.2)
NEUTROPHILS NFR BLD AUTO: 66.7 %
PHOSPHATE SERPL-MCNC: 3.6 MG/DL (ref 2.3–4.7)
PLATELET # BLD AUTO: 230 X10(3)/MCL (ref 130–400)
PMV BLD AUTO: 10 FL (ref 7.4–10.4)
POTASSIUM SERPL-SCNC: 4.8 MMOL/L (ref 3.5–5.1)
PROT SERPL-MCNC: 5.3 GM/DL (ref 5.8–7.6)
RBC # BLD AUTO: 3.08 X10(6)/MCL (ref 4.7–6.1)
SODIUM SERPL-SCNC: 134 MMOL/L (ref 136–145)
WBC # SPEC AUTO: 5.9 X10(3)/MCL (ref 4.5–11.5)

## 2023-05-01 PROCEDURE — C9113 INJ PANTOPRAZOLE SODIUM, VIA: HCPCS | Performed by: SURGERY

## 2023-05-01 PROCEDURE — A4216 STERILE WATER/SALINE, 10 ML: HCPCS | Performed by: FAMILY MEDICINE

## 2023-05-01 PROCEDURE — 63600175 PHARM REV CODE 636 W HCPCS: Performed by: FAMILY MEDICINE

## 2023-05-01 PROCEDURE — 94761 N-INVAS EAR/PLS OXIMETRY MLT: CPT

## 2023-05-01 PROCEDURE — 84100 ASSAY OF PHOSPHORUS: CPT | Performed by: FAMILY MEDICINE

## 2023-05-01 PROCEDURE — S0030 INJECTION, METRONIDAZOLE: HCPCS | Performed by: FAMILY MEDICINE

## 2023-05-01 PROCEDURE — 25000003 PHARM REV CODE 250: Performed by: FAMILY MEDICINE

## 2023-05-01 PROCEDURE — 25000003 PHARM REV CODE 250: Performed by: SURGERY

## 2023-05-01 PROCEDURE — 63600175 PHARM REV CODE 636 W HCPCS: Performed by: SURGERY

## 2023-05-01 PROCEDURE — 80053 COMPREHEN METABOLIC PANEL: CPT | Performed by: FAMILY MEDICINE

## 2023-05-01 PROCEDURE — 83735 ASSAY OF MAGNESIUM: CPT | Performed by: FAMILY MEDICINE

## 2023-05-01 PROCEDURE — 97530 THERAPEUTIC ACTIVITIES: CPT | Mod: CQ

## 2023-05-01 PROCEDURE — 11000001 HC ACUTE MED/SURG PRIVATE ROOM

## 2023-05-01 PROCEDURE — 99900035 HC TECH TIME PER 15 MIN (STAT)

## 2023-05-01 PROCEDURE — 85025 COMPLETE CBC W/AUTO DIFF WBC: CPT | Performed by: FAMILY MEDICINE

## 2023-05-01 PROCEDURE — 63700000 PHARM REV CODE 250 ALT 637 W/O HCPCS: Performed by: FAMILY MEDICINE

## 2023-05-01 PROCEDURE — 94799 UNLISTED PULMONARY SVC/PX: CPT

## 2023-05-01 RX ORDER — OLANZAPINE 5 MG/1
15 TABLET ORAL NIGHTLY
Status: DISCONTINUED | OUTPATIENT
Start: 2023-05-01 | End: 2023-05-04

## 2023-05-01 RX ORDER — SODIUM CHLORIDE 9 MG/ML
INJECTION, SOLUTION INTRAVENOUS CONTINUOUS
Status: DISCONTINUED | OUTPATIENT
Start: 2023-05-01 | End: 2023-05-04

## 2023-05-01 RX ADMIN — SODIUM CHLORIDE, PRESERVATIVE FREE 10 ML: 5 INJECTION INTRAVENOUS at 06:05

## 2023-05-01 RX ADMIN — FERROUS GLUCONATE 324 MG: 324 TABLET ORAL at 10:05

## 2023-05-01 RX ADMIN — SODIUM CHLORIDE: 9 INJECTION, SOLUTION INTRAVENOUS at 10:05

## 2023-05-01 RX ADMIN — NEBIVOLOL 10 MG: 5 TABLET ORAL at 09:05

## 2023-05-01 RX ADMIN — SUCRALFATE ORAL 1 G: 1 SUSPENSION ORAL at 03:05

## 2023-05-01 RX ADMIN — FLUCONAZOLE 100 MG: 100 TABLET ORAL at 10:05

## 2023-05-01 RX ADMIN — LEVOFLOXACIN 500 MG: 500 INJECTION, SOLUTION INTRAVENOUS at 10:05

## 2023-05-01 RX ADMIN — SODIUM CHLORIDE, PRESERVATIVE FREE 10 ML: 5 INJECTION INTRAVENOUS at 05:05

## 2023-05-01 RX ADMIN — HYDROCHLOROTHIAZIDE 25 MG: 25 TABLET ORAL at 10:05

## 2023-05-01 RX ADMIN — PANTOPRAZOLE SODIUM 8 MG/HR: 40 INJECTION, POWDER, FOR SOLUTION INTRAVENOUS at 03:05

## 2023-05-01 RX ADMIN — HYDRALAZINE HYDROCHLORIDE 25 MG: 25 TABLET, FILM COATED ORAL at 09:05

## 2023-05-01 RX ADMIN — SODIUM CHLORIDE, PRESERVATIVE FREE 10 ML: 5 INJECTION INTRAVENOUS at 12:05

## 2023-05-01 RX ADMIN — TAMSULOSIN HYDROCHLORIDE 0.4 MG: 0.4 CAPSULE ORAL at 09:05

## 2023-05-01 RX ADMIN — MUPIROCIN 1 G: 20 OINTMENT TOPICAL at 10:05

## 2023-05-01 RX ADMIN — PANTOPRAZOLE SODIUM 8 MG/HR: 40 INJECTION, POWDER, FOR SOLUTION INTRAVENOUS at 12:05

## 2023-05-01 RX ADMIN — Medication 250 MG: at 10:05

## 2023-05-01 RX ADMIN — MICONAZOLE NITRATE: 20 CREAM TOPICAL at 10:05

## 2023-05-01 RX ADMIN — OLANZAPINE 15 MG: 5 TABLET, FILM COATED ORAL at 09:05

## 2023-05-01 RX ADMIN — MUPIROCIN 1 G: 20 OINTMENT TOPICAL at 09:05

## 2023-05-01 RX ADMIN — METRONIDAZOLE 500 MG: 5 INJECTION, SOLUTION INTRAVENOUS at 10:05

## 2023-05-01 RX ADMIN — SUCRALFATE ORAL 1 G: 1 SUSPENSION ORAL at 09:05

## 2023-05-01 RX ADMIN — AMLODIPINE BESYLATE 10 MG: 10 TABLET ORAL at 09:05

## 2023-05-01 RX ADMIN — METRONIDAZOLE 500 MG: 5 INJECTION, SOLUTION INTRAVENOUS at 05:05

## 2023-05-01 RX ADMIN — MICONAZOLE NITRATE: 20 CREAM TOPICAL at 09:05

## 2023-05-01 RX ADMIN — METRONIDAZOLE 500 MG: 5 INJECTION, SOLUTION INTRAVENOUS at 02:05

## 2023-05-01 RX ADMIN — SUCRALFATE ORAL 1 G: 1 SUSPENSION ORAL at 10:05

## 2023-05-01 RX ADMIN — HYDRALAZINE HYDROCHLORIDE 25 MG: 25 TABLET, FILM COATED ORAL at 10:05

## 2023-05-01 RX ADMIN — CETIRIZINE HYDROCHLORIDE 10 MG: 10 TABLET, FILM COATED ORAL at 10:05

## 2023-05-01 RX ADMIN — SPIRONOLACTONE 25 MG: 25 TABLET ORAL at 10:05

## 2023-05-01 NOTE — PT/OT/SLP PROGRESS
Physical Therapy Treatment    Patient Name:  Claus Mcguire   MRN:  [id number]    Recommendations:     Discharge Recommendations:  skilled nursing facility  Discharge Equipment Recommendations:    Barriers to discharge:  medical status    Assessment:     Claus Mcguire is a 82 y.o. male admitted with a medical diagnosis of Dehydration.  He presents with the following impairments/functional limitations: weakness, impaired endurance, impaired self care skills, impaired functional mobility, gait instability.    Pt able to participate in functional mobility and gait within his room, requiring mod A and RW. He ambulated to his restroom and back and then another trial was performed to the door and back. He required frequent cues for sequencing and fall prevention as well as assistance to safely navigate AD. He fatigues quickly but is able to recover with rest.     Rehab Prognosis: Good; patient would benefit from acute skilled PT services to address these deficits and reach maximum level of function.    Recent Surgery: Procedure(s) (LRB):  EGD (ESOPHAGOGASTRODUODENOSCOPY) (N/A) 4 Days Post-Op    Plan:     During this hospitalization, patient to be seen daily to address the identified rehab impairments via gait training, therapeutic activities, therapeutic exercises and progress toward the following goals:    Plan of Care Expires:       Subjective     Chief Complaint: weakness  Patient/Family Comments/goals: increase mobility  Pain/Comfort:         Objective:     Communicated with family prior to session.  Patient found supine with   upon PT entry to room.     General Precautions: Standard,   Orthopedic Precautions:    Braces:    Respiratory Status: Room air     Functional Mobility:  Bed Mobility:     Supine to Sit: moderate assistance  Transfers:     Sit to Stand:  moderate assistance with rolling walker  Toilet Transfer: moderate assistance with  rolling walker and grab bars  using  Step Transfer  Gait: 2 x 15' and x 30'  mod A RW, cues for guidance  Balance: mod A in standing      AM-PAC 6 CLICK MOBILITY          Treatment & Education:  See above    Patient left up in chair with all lines intact, call button in reach, chair alarm on, and family present.    GOALS:   Multidisciplinary Problems       Physical Therapy Goals          Problem: Physical Therapy    Goal Priority Disciplines Outcome Goal Variances Interventions   Physical Therapy Goal     PT, PT/OT Ongoing, Progressing                         Time Tracking:     PT Received On:  05/01/2023  PT Start Time:  1415     PT Stop Time:  1445  PT Total Time (min):   30 min    Billable Minutes: Therapeutic Activity 30     PTA              05/01/2023

## 2023-05-01 NOTE — SUBJECTIVE & OBJECTIVE
hypertension    No past surgical history    Review of patient's allergies indicates:   Allergen Reactions    Penicillins Swelling       No current facility-administered medications on file prior to encounter.     Current Outpatient Medications on File Prior to Encounter   Medication Sig    amLODIPine (NORVASC) 10 MG tablet Take 10 mg by mouth every evening.    hydrALAZINE (APRESOLINE) 25 MG tablet Take 25 mg by mouth 2 (two) times daily.    meloxicam (MOBIC) 15 MG tablet Take 15 mg by mouth.    nebivoloL (BYSTOLIC) 5 MG Tab Take 5 mg by mouth.    olmesartan (BENICAR) 40 MG tablet Take 40 mg by mouth.    spironolactone-hydrochlorothiazide 25-25mg (ALDACTAZIDE) 25-25 mg Tab Take 1 tablet by mouth.    tamsulosin (FLOMAX) 0.4 mg Cap Take 1 capsule by mouth every evening.     Family History    None       Tobacco Use    Smoking status: Former     Packs/day: 2.00     Years: 1.00     Pack years: 2.00     Types: Cigarettes    Smokeless tobacco: Current     Types: Chew   Substance and Sexual Activity    Alcohol use: Yes     Alcohol/week: 6.0 standard drinks     Types: 6 Cans of beer per week    Drug use: Never    Sexual activity: Not Currently     Review of Systems   Constitutional:  Positive for appetite change and fatigue. Negative for fever.   HENT:  Positive for congestion and rhinorrhea.    Eyes: Negative.    Respiratory:  Positive for cough.    Cardiovascular: Negative.    Gastrointestinal:  Positive for diarrhea and nausea. Negative for vomiting.   Endocrine: Negative.    Genitourinary: Negative.    Musculoskeletal:  Positive for back pain.   Skin:  Positive for rash (to the buttocks).   Allergic/Immunologic: Negative.    Neurological: Negative.    Hematological: Negative.    Psychiatric/Behavioral: Negative.     Objective:     Vital Signs (Most Recent):  Temp: 98.1 °F (36.7 °C) (05/01/23 0733)  Pulse: (!) 55 (05/01/23 0733)  Resp: 20 (05/01/23 0733)  BP: (!) 130/54 (05/01/23 0733)  SpO2: 96 % (05/01/23 0733)   Vital  Signs (24h Range):  Temp:  [97.6 °F (36.4 °C)-99.6 °F (37.6 °C)] 98.1 °F (36.7 °C)  Pulse:  [55-60] 55  Resp:  [20] 20  SpO2:  [93 %-99 %] 96 %  BP: (113-152)/(50-64) 130/54     Weight: 80 kg (176 lb 5.9 oz)  Body mass index is 27.62 kg/m².    Physical Exam  Constitutional:       Appearance: Normal appearance. He is normal weight. He is ill-appearing.   HENT:      Head: Normocephalic and atraumatic.      Left Ear: Tympanic membrane normal.      Nose: Congestion and rhinorrhea present.      Mouth/Throat:      Mouth: Mucous membranes are moist.      Pharynx: Oropharynx is clear. No oropharyngeal exudate.   Eyes:      Extraocular Movements: Extraocular movements intact.      Conjunctiva/sclera: Conjunctivae normal.      Pupils: Pupils are equal, round, and reactive to light.   Cardiovascular:      Rate and Rhythm: Normal rate and regular rhythm.      Pulses: Normal pulses.      Heart sounds: Normal heart sounds. No murmur heard.    No gallop.   Pulmonary:      Effort: Pulmonary effort is normal.      Breath sounds: Normal breath sounds.   Abdominal:      General: Abdomen is flat. Bowel sounds are normal. There is no distension.      Palpations: Abdomen is soft.   Genitourinary:     Penis: Normal.    Musculoskeletal:         General: Normal range of motion.      Cervical back: Normal range of motion and neck supple.      Right lower leg: No edema.   Skin:     General: Skin is warm and dry.      Comments: Erythematous rash to the buttocks   Neurological:      General: No focal deficit present.      Mental Status: He is alert and oriented to person, place, and time. Mental status is at baseline.      Cranial Nerves: No cranial nerve deficit.      Motor: No weakness.      Gait: Gait normal.   Psychiatric:         Mood and Affect: Mood normal.         Behavior: Behavior normal.         Thought Content: Thought content normal.         Judgment: Judgment normal.         CRANIAL NERVES     CN III, IV, VI   Pupils are equal,  round, and reactive to light.     Significant Labs: All pertinent labs within the past 24 hours have been reviewed.  CBC:   Recent Labs   Lab 04/29/23  1817 04/30/23  0334 05/01/23  0407   WBC  --  6.1 5.9   HGB 9.1* 8.7* 8.3*   HCT 29.0* 28.5* 27.6*   PLT  --  243 230       CMP:   Recent Labs   Lab 04/30/23  0334 05/01/23  0408   * 134*   K 5.0 4.8   CO2 19* 16*   BUN 21.0 30.0*   CREATININE 1.50* 1.69*   CALCIUM 8.5* 8.2*   ALBUMIN 2.3* 2.2*   BILITOT 0.3 0.3   ALKPHOS 55 50   AST 19 23   ALT 14 17         Significant Imaging: I have reviewed all pertinent imaging results/findings within the past 24 hours.

## 2023-05-01 NOTE — PROGRESS NOTES
[facility] - Medical Surgical Unit  [facility] Medicine  Progress Note    Patient Name: Claus Mcguire  MRN: [id number]  Patient Class: IP- Inpatient   Admission Date: 4/26/2023  Length of Stay: 4 days  Attending Physician: Juan Quispe MD  Primary Care Provider: Juan Quispe MD        Subjective:     Principal Problem:Dehydration        HPI:  The 82 year old known to me from clinic.  Presented with 3 days of weakness some nausea but no vomiting no recent travel no questionable food.  Labs were done today which showed increased BUN and creatinine and decreased sodium and chloride.  Both consistent with dehydration.  Also noted was a decrease in hemoglobin hematocrit 11 hemoglobin down to 8.1 in last 6 months.  Patient is weak at this time.  We are admitting the patient for IV fluid hydration workup of his diarrhea and workup of his acute anemia.      Overview/Hospital Course:  Hemoglobin is  stable,  but stool is dark  Nausea has resolved  No abdominal pain today  No sob, is coughing and c/o upper respiratory congestion with clear sputum  No cp  No diarrhea since admit  Has some confusion   Tolerating po well      hypertension    No past surgical history    Review of patient's allergies indicates:   Allergen Reactions    Penicillins Swelling       No current facility-administered medications on file prior to encounter.     Current Outpatient Medications on File Prior to Encounter   Medication Sig    amLODIPine (NORVASC) 10 MG tablet Take 10 mg by mouth every evening.    hydrALAZINE (APRESOLINE) 25 MG tablet Take 25 mg by mouth 2 (two) times daily.    meloxicam (MOBIC) 15 MG tablet Take 15 mg by mouth.    nebivoloL (BYSTOLIC) 5 MG Tab Take 5 mg by mouth.    olmesartan (BENICAR) 40 MG tablet Take 40 mg by mouth.    spironolactone-hydrochlorothiazide 25-25mg (ALDACTAZIDE) 25-25 mg Tab Take 1 tablet by mouth.    tamsulosin (FLOMAX) 0.4 mg Cap Take 1 capsule by mouth every evening.     Family  History    None       Tobacco Use    Smoking status: Former     Packs/day: 2.00     Years: 1.00     Pack years: 2.00     Types: Cigarettes    Smokeless tobacco: Current     Types: Chew   Substance and Sexual Activity    Alcohol use: Yes     Alcohol/week: 6.0 standard drinks     Types: 6 Cans of beer per week    Drug use: Never    Sexual activity: Not Currently     Review of Systems   Constitutional:  Positive for appetite change and fatigue. Negative for fever.   HENT:  Positive for congestion and rhinorrhea.    Eyes: Negative.    Respiratory:  Positive for cough.    Cardiovascular: Negative.    Gastrointestinal:  Positive for diarrhea and nausea. Negative for vomiting.   Endocrine: Negative.    Genitourinary: Negative.    Musculoskeletal:  Positive for back pain.   Skin:  Positive for rash (to the buttocks).   Allergic/Immunologic: Negative.    Neurological: Negative.    Hematological: Negative.    Psychiatric/Behavioral: Negative.     Objective:     Vital Signs (Most Recent):  Temp: 98.1 °F (36.7 °C) (05/01/23 0733)  Pulse: (!) 55 (05/01/23 0733)  Resp: 20 (05/01/23 0733)  BP: (!) 130/54 (05/01/23 0733)  SpO2: 96 % (05/01/23 0733)   Vital Signs (24h Range):  Temp:  [97.6 °F (36.4 °C)-99.6 °F (37.6 °C)] 98.1 °F (36.7 °C)  Pulse:  [55-60] 55  Resp:  [20] 20  SpO2:  [93 %-99 %] 96 %  BP: (113-152)/(50-64) 130/54     Weight: 80 kg (176 lb 5.9 oz)  Body mass index is 27.62 kg/m².    Physical Exam  Constitutional:       Appearance: Normal appearance. He is normal weight. He is ill-appearing.   HENT:      Head: Normocephalic and atraumatic.      Left Ear: Tympanic membrane normal.      Nose: Congestion and rhinorrhea present.      Mouth/Throat:      Mouth: Mucous membranes are moist.      Pharynx: Oropharynx is clear. No oropharyngeal exudate.   Eyes:      Extraocular Movements: Extraocular movements intact.      Conjunctiva/sclera: Conjunctivae normal.      Pupils: Pupils are equal, round, and reactive to light.    Cardiovascular:      Rate and Rhythm: Normal rate and regular rhythm.      Pulses: Normal pulses.      Heart sounds: Normal heart sounds. No murmur heard.    No gallop.   Pulmonary:      Effort: Pulmonary effort is normal.      Breath sounds: Normal breath sounds.   Abdominal:      General: Abdomen is flat. Bowel sounds are normal. There is no distension.      Palpations: Abdomen is soft.   Genitourinary:     Penis: Normal.    Musculoskeletal:         General: Normal range of motion.      Cervical back: Normal range of motion and neck supple.      Right lower leg: No edema.   Skin:     General: Skin is warm and dry.      Comments: Erythematous rash to the buttocks   Neurological:      General: No focal deficit present.      Mental Status: He is alert and oriented to person, place, and time. Mental status is at baseline.      Cranial Nerves: No cranial nerve deficit.      Motor: No weakness.      Gait: Gait normal.   Psychiatric:         Mood and Affect: Mood normal.         Behavior: Behavior normal.         Thought Content: Thought content normal.         Judgment: Judgment normal.         CRANIAL NERVES     CN III, IV, VI   Pupils are equal, round, and reactive to light.     Significant Labs: All pertinent labs within the past 24 hours have been reviewed.  CBC:   Recent Labs   Lab 04/29/23  1817 04/30/23  0334 05/01/23  0407   WBC  --  6.1 5.9   HGB 9.1* 8.7* 8.3*   HCT 29.0* 28.5* 27.6*   PLT  --  243 230       CMP:   Recent Labs   Lab 04/30/23  0334 05/01/23  0408   * 134*   K 5.0 4.8   CO2 19* 16*   BUN 21.0 30.0*   CREATININE 1.50* 1.69*   CALCIUM 8.5* 8.2*   ALBUMIN 2.3* 2.2*   BILITOT 0.3 0.3   ALKPHOS 55 50   AST 19 23   ALT 14 17         Significant Imaging: I have reviewed all pertinent imaging results/findings within the past 24 hours.      Assessment/Plan:      * Dehydration  contiunue iv fluids.   Gi pcr was collected in clinic pos for c-diff.    Follow cmp and h/h  Fluids  To ns at 75cc now  that taking po    Infiltrate noted on imaging study    Continue levaquin    Agitation    I tim pect underlying dementia compliacated by new surroundings and illness    Chronic gastric ulcer with hemorrhage  contiue to feed ,   carafate and iv ppi      Clostridium difficile infection  continue metronidazole  Lift isolation    Duodenal ulcer    continue protonoix drip  carafate tid  Tolerating regular diet    Acute blood loss anemia    Improved after transfusion  Follow h/h continue iron    Gastrointestinal hemorrhage associated with anorectal source    As above    Essential hypertension  Continue home meds and hydralazine prn, clonidine patch    Weakness    I v fluids.   likely due to dehydration and anemia  Continue pt    Other specified anemias    s/p  Transfuse 2 units  Order ct abdomen no contrast with swelling to the duodenum and distal stomach consistent with ulcer  Follow h/h    Diarrhea of presumed infectious origin    Metronidazole for c-diff  Resolved will remove isolation      VTE Risk Mitigation (From admission, onward)         Ordered     IP VTE LOW RISK PATIENT  Once         04/26/23 1538     Place sequential compression device  Until discontinued         04/26/23 1538                Discharge Planning   KB:      Code Status: Full Code   Is the patient medically ready for discharge?:     Reason for patient still in hospital (select all that apply): Patient trending condition  Discharge Plan A: Home Health                  Juan Quispe MD  Department of Hospital Medicine   [facility] - [facility]

## 2023-05-01 NOTE — ASSESSMENT & PLAN NOTE
contiunue iv fluids.   Gi pcr was collected in clinic pos for c-diff.    Follow cmp and h/h  Fluids  To ns at 75cc now that taking po

## 2023-05-01 NOTE — PLAN OF CARE
Spoke to pt wife and daughter about which SNF facility they want me to try and place pt.  They want [facility] and [facility].  Referral sent and messaged patrice Cervantes/c coordinator at [facility], for 142.    Wife cell [phone]  Daughter cell [phone]

## 2023-05-01 NOTE — PLAN OF CARE
Problem: Adult Inpatient Plan of Care  Goal: Plan of Care Review  Outcome: Ongoing, Progressing  Goal: Patient-Specific Goal (Individualized)  Outcome: Ongoing, Progressing  Goal: Absence of Hospital-Acquired Illness or Injury  Outcome: Ongoing, Progressing  Goal: Optimal Comfort and Wellbeing  Outcome: Ongoing, Progressing  Goal: Readiness for Transition of Care  Outcome: Ongoing, Progressing     Problem: Infection  Goal: Absence of Infection Signs and Symptoms  Outcome: Ongoing, Progressing     Problem: Skin Injury Risk Increased  Goal: Skin Health and Integrity  Outcome: Ongoing, Progressing     Problem: Fluid Volume Deficit  Goal: Fluid Balance  Outcome: Ongoing, Progressing     Problem: Fatigue  Goal: Improved Activity Tolerance  Outcome: Ongoing, Progressing     Problem: Anemia  Goal: Anemia Symptom Improvement  Outcome: Ongoing, Progressing     Problem: Balance Impairment (Functional Deficit)  Goal: Improved Balance and Postural Control  Outcome: Ongoing, Progressing     Problem: Muscle Strength Impairment (Functional Deficit)  Goal: Improved Muscle Strength  Outcome: Ongoing, Progressing

## 2023-05-02 PROBLEM — K62.5 GASTROINTESTINAL HEMORRHAGE ASSOCIATED WITH ANORECTAL SOURCE: Status: RESOLVED | Noted: 2023-04-27 | Resolved: 2023-05-02

## 2023-05-02 PROBLEM — D64.89 OTHER SPECIFIED ANEMIAS: Status: RESOLVED | Noted: 2023-04-26 | Resolved: 2023-05-02

## 2023-05-02 LAB
ALBUMIN SERPL-MCNC: 2.2 G/DL (ref 3.4–4.8)
ALBUMIN/GLOB SERPL: 0.6 RATIO (ref 1.1–2)
ALP SERPL-CCNC: 56 UNIT/L (ref 40–150)
ALT SERPL-CCNC: 20 UNIT/L (ref 0–55)
AST SERPL-CCNC: 32 UNIT/L (ref 5–34)
BASOPHILS # BLD AUTO: 0.02 X10(3)/MCL
BASOPHILS NFR BLD AUTO: 0.3 %
BILIRUBIN DIRECT+TOT PNL SERPL-MCNC: 0.2 MG/DL
BUN SERPL-MCNC: 31 MG/DL (ref 8.4–25.7)
CALCIUM SERPL-MCNC: 8.4 MG/DL (ref 8.8–10)
CHLORIDE SERPL-SCNC: 112 MMOL/L (ref 98–107)
CO2 SERPL-SCNC: 15 MMOL/L (ref 23–31)
CREAT SERPL-MCNC: 1.61 MG/DL (ref 0.73–1.18)
EOSINOPHIL # BLD AUTO: 0.12 X10(3)/MCL (ref 0–0.9)
EOSINOPHIL NFR BLD AUTO: 1.9 %
ERYTHROCYTE [DISTWIDTH] IN BLOOD BY AUTOMATED COUNT: 14.6 % (ref 11.5–17)
GFR SERPLBLD CREATININE-BSD FMLA CKD-EPI: 43 MLS/MIN/1.73/M2
GLOBULIN SER-MCNC: 3.5 GM/DL (ref 2.4–3.5)
GLUCOSE SERPL-MCNC: 109 MG/DL (ref 82–115)
HCT VFR BLD AUTO: 26.9 % (ref 42–52)
HGB BLD-MCNC: 8.2 G/DL (ref 14–18)
IMM GRANULOCYTES # BLD AUTO: 0.05 X10(3)/MCL (ref 0–0.04)
IMM GRANULOCYTES NFR BLD AUTO: 0.8 %
LYMPHOCYTES # BLD AUTO: 0.98 X10(3)/MCL (ref 0.6–4.6)
LYMPHOCYTES NFR BLD AUTO: 15.9 %
MCH RBC QN AUTO: 26.7 PG (ref 27–31)
MCHC RBC AUTO-ENTMCNC: 30.5 G/DL (ref 33–36)
MCV RBC AUTO: 87.6 FL (ref 80–94)
MONOCYTES # BLD AUTO: 0.81 X10(3)/MCL (ref 0.1–1.3)
MONOCYTES NFR BLD AUTO: 13.1 %
NEUTROPHILS # BLD AUTO: 4.18 X10(3)/MCL (ref 2.1–9.2)
NEUTROPHILS NFR BLD AUTO: 68 %
PLATELET # BLD AUTO: 254 X10(3)/MCL (ref 130–400)
PMV BLD AUTO: 10 FL (ref 7.4–10.4)
POTASSIUM SERPL-SCNC: 4.9 MMOL/L (ref 3.5–5.1)
PROT SERPL-MCNC: 5.7 GM/DL (ref 5.8–7.6)
RBC # BLD AUTO: 3.07 X10(6)/MCL (ref 4.7–6.1)
SODIUM SERPL-SCNC: 137 MMOL/L (ref 136–145)
WBC # SPEC AUTO: 6.16 X10(3)/MCL (ref 4.5–11.5)

## 2023-05-02 PROCEDURE — 25000242 PHARM REV CODE 250 ALT 637 W/ HCPCS: Performed by: FAMILY MEDICINE

## 2023-05-02 PROCEDURE — 97530 THERAPEUTIC ACTIVITIES: CPT | Mod: CQ

## 2023-05-02 PROCEDURE — 63700000 PHARM REV CODE 250 ALT 637 W/O HCPCS: Performed by: FAMILY MEDICINE

## 2023-05-02 PROCEDURE — 25000003 PHARM REV CODE 250: Performed by: FAMILY MEDICINE

## 2023-05-02 PROCEDURE — 94640 AIRWAY INHALATION TREATMENT: CPT

## 2023-05-02 PROCEDURE — S0030 INJECTION, METRONIDAZOLE: HCPCS | Performed by: FAMILY MEDICINE

## 2023-05-02 PROCEDURE — 85025 COMPLETE CBC W/AUTO DIFF WBC: CPT | Performed by: FAMILY MEDICINE

## 2023-05-02 PROCEDURE — 63600175 PHARM REV CODE 636 W HCPCS: Performed by: FAMILY MEDICINE

## 2023-05-02 PROCEDURE — 99900035 HC TECH TIME PER 15 MIN (STAT)

## 2023-05-02 PROCEDURE — 94799 UNLISTED PULMONARY SVC/PX: CPT

## 2023-05-02 PROCEDURE — 94761 N-INVAS EAR/PLS OXIMETRY MLT: CPT

## 2023-05-02 PROCEDURE — A4216 STERILE WATER/SALINE, 10 ML: HCPCS | Performed by: FAMILY MEDICINE

## 2023-05-02 PROCEDURE — 11000001 HC ACUTE MED/SURG PRIVATE ROOM

## 2023-05-02 PROCEDURE — 27000221 HC OXYGEN, UP TO 24 HOURS

## 2023-05-02 PROCEDURE — 80053 COMPREHEN METABOLIC PANEL: CPT | Performed by: FAMILY MEDICINE

## 2023-05-02 PROCEDURE — 25000003 PHARM REV CODE 250: Performed by: SURGERY

## 2023-05-02 PROCEDURE — 97116 GAIT TRAINING THERAPY: CPT | Mod: CQ

## 2023-05-02 RX ADMIN — HYDROCHLOROTHIAZIDE 25 MG: 25 TABLET ORAL at 09:05

## 2023-05-02 RX ADMIN — HYDRALAZINE HYDROCHLORIDE 25 MG: 25 TABLET, FILM COATED ORAL at 10:05

## 2023-05-02 RX ADMIN — FLUCONAZOLE 100 MG: 100 TABLET ORAL at 09:05

## 2023-05-02 RX ADMIN — METRONIDAZOLE 500 MG: 5 INJECTION, SOLUTION INTRAVENOUS at 05:05

## 2023-05-02 RX ADMIN — FERROUS GLUCONATE 324 MG: 324 TABLET ORAL at 09:05

## 2023-05-02 RX ADMIN — OLANZAPINE 15 MG: 5 TABLET, FILM COATED ORAL at 10:05

## 2023-05-02 RX ADMIN — SODIUM CHLORIDE, PRESERVATIVE FREE 10 ML: 5 INJECTION INTRAVENOUS at 06:05

## 2023-05-02 RX ADMIN — SODIUM CHLORIDE, PRESERVATIVE FREE 10 ML: 5 INJECTION INTRAVENOUS at 05:05

## 2023-05-02 RX ADMIN — SODIUM CHLORIDE: 9 INJECTION, SOLUTION INTRAVENOUS at 01:05

## 2023-05-02 RX ADMIN — HYDRALAZINE HYDROCHLORIDE 25 MG: 25 TABLET, FILM COATED ORAL at 09:05

## 2023-05-02 RX ADMIN — TAMSULOSIN HYDROCHLORIDE 0.4 MG: 0.4 CAPSULE ORAL at 10:05

## 2023-05-02 RX ADMIN — SODIUM CHLORIDE, PRESERVATIVE FREE 10 ML: 5 INJECTION INTRAVENOUS at 12:05

## 2023-05-02 RX ADMIN — IPRATROPIUM BROMIDE AND ALBUTEROL SULFATE 3 ML: .5; 3 SOLUTION RESPIRATORY (INHALATION) at 07:05

## 2023-05-02 RX ADMIN — SPIRONOLACTONE 25 MG: 25 TABLET ORAL at 09:05

## 2023-05-02 RX ADMIN — METRONIDAZOLE 500 MG: 5 INJECTION, SOLUTION INTRAVENOUS at 12:05

## 2023-05-02 RX ADMIN — NEBIVOLOL 10 MG: 5 TABLET ORAL at 09:05

## 2023-05-02 RX ADMIN — Medication 250 MG: at 09:05

## 2023-05-02 RX ADMIN — AMLODIPINE BESYLATE 10 MG: 10 TABLET ORAL at 10:05

## 2023-05-02 RX ADMIN — MICONAZOLE NITRATE: 20 CREAM TOPICAL at 10:05

## 2023-05-02 RX ADMIN — METRONIDAZOLE 500 MG: 5 INJECTION, SOLUTION INTRAVENOUS at 01:05

## 2023-05-02 RX ADMIN — CETIRIZINE HYDROCHLORIDE 10 MG: 10 TABLET, FILM COATED ORAL at 09:05

## 2023-05-02 RX ADMIN — SUCRALFATE ORAL 1 G: 1 SUSPENSION ORAL at 09:05

## 2023-05-02 RX ADMIN — SUCRALFATE ORAL 1 G: 1 SUSPENSION ORAL at 10:05

## 2023-05-02 RX ADMIN — SUCRALFATE ORAL 1 G: 1 SUSPENSION ORAL at 02:05

## 2023-05-02 RX ADMIN — LEVOFLOXACIN 500 MG: 500 INJECTION, SOLUTION INTRAVENOUS at 09:05

## 2023-05-02 NOTE — PLAN OF CARE
05/02/23 1635   Discharge Reassessment   Assessment Type Discharge Planning Reassessment   Discharge Plan discussed with: Spouse/sig other;Adult children   Discharge Plan A Skilled Nursing Facility   Discharge Plan B Skilled Nursing Facility   DME Needed Upon Discharge  none   Discharge Barriers Identified None   Why the patient remains in the hospital Requires continued medical care   Post-Acute Status   Post-Acute Authorization Placement   Post-Acute Placement Status Pending Bed Availability   Discharge Delays None known at this time     [facility] bed available Friday.  [facility]ina mendoza/ [facility] has call into wife to see if she will agree for pt to d/c to SNF at [facility].  Will follow up in AM.  PSSR/142 sent to [facility].    Post-acute LOC Review -rec SNF placement, same as MD proposes.

## 2023-05-02 NOTE — PLAN OF CARE
[facility] called [facility] and left VM for them to call her back.  [facility] will not have a male bed til Friday and [facility]  Waiting for decision.

## 2023-05-02 NOTE — SUBJECTIVE & OBJECTIVE
hypertension    No past surgical history    Review of patient's allergies indicates:   Allergen Reactions    Penicillins Swelling       No current facility-administered medications on file prior to encounter.     Current Outpatient Medications on File Prior to Encounter   Medication Sig    amLODIPine (NORVASC) 10 MG tablet Take 10 mg by mouth every evening.    hydrALAZINE (APRESOLINE) 25 MG tablet Take 25 mg by mouth 2 (two) times daily.    meloxicam (MOBIC) 15 MG tablet Take 15 mg by mouth.    nebivoloL (BYSTOLIC) 5 MG Tab Take 5 mg by mouth.    olmesartan (BENICAR) 40 MG tablet Take 40 mg by mouth.    spironolactone-hydrochlorothiazide 25-25mg (ALDACTAZIDE) 25-25 mg Tab Take 1 tablet by mouth.    tamsulosin (FLOMAX) 0.4 mg Cap Take 1 capsule by mouth every evening.     Family History    None       Tobacco Use    Smoking status: Former     Packs/day: 2.00     Years: 1.00     Pack years: 2.00     Types: Cigarettes    Smokeless tobacco: Current     Types: Chew   Substance and Sexual Activity    Alcohol use: Yes     Alcohol/week: 6.0 standard drinks     Types: 6 Cans of beer per week    Drug use: Never    Sexual activity: Not Currently     Review of Systems   Constitutional:  Positive for appetite change and fatigue. Negative for fever.   HENT:  Positive for congestion and rhinorrhea.    Eyes: Negative.    Respiratory:  Positive for cough.    Cardiovascular: Negative.    Gastrointestinal:  Positive for diarrhea and nausea. Negative for vomiting.   Endocrine: Negative.    Genitourinary: Negative.    Musculoskeletal:  Positive for back pain.   Skin:  Positive for rash (to the buttocks).   Allergic/Immunologic: Negative.    Neurological: Negative.    Hematological: Negative.    Psychiatric/Behavioral: Negative.     Objective:     Vital Signs (Most Recent):  Temp: 98.3 °F (36.8 °C) (05/02/23 0419)  Pulse: (!) 52 (05/02/23 0732)  Resp: 18 (05/02/23 0732)  BP: 134/63 (05/02/23 0419)  SpO2: (!) 92 % (05/02/23 0732)   Vital  Signs (24h Range):  Temp:  [97.3 °F (36.3 °C)-99.3 °F (37.4 °C)] 98.3 °F (36.8 °C)  Pulse:  [51-84] 52  Resp:  [18-20] 18  SpO2:  [92 %-95 %] 92 %  BP: (116-148)/(51-63) 134/63     Weight: 80 kg (176 lb 5.9 oz)  Body mass index is 27.62 kg/m².    Physical Exam  Constitutional:       Appearance: Normal appearance. He is normal weight. He is ill-appearing.   HENT:      Head: Normocephalic and atraumatic.      Left Ear: Tympanic membrane normal.      Nose: Congestion and rhinorrhea present.      Mouth/Throat:      Mouth: Mucous membranes are moist.      Pharynx: Oropharynx is clear. No oropharyngeal exudate.   Eyes:      Extraocular Movements: Extraocular movements intact.      Conjunctiva/sclera: Conjunctivae normal.      Pupils: Pupils are equal, round, and reactive to light.   Cardiovascular:      Rate and Rhythm: Normal rate and regular rhythm.      Pulses: Normal pulses.      Heart sounds: Normal heart sounds. No murmur heard.    No gallop.   Pulmonary:      Effort: Pulmonary effort is normal.      Breath sounds: Normal breath sounds.   Abdominal:      General: Abdomen is flat. Bowel sounds are normal. There is no distension.      Palpations: Abdomen is soft.   Genitourinary:     Penis: Normal.    Musculoskeletal:         General: Normal range of motion.      Cervical back: Normal range of motion and neck supple.      Right lower leg: No edema.   Skin:     General: Skin is warm and dry.      Comments: Erythematous rash to the buttocks   Neurological:      General: No focal deficit present.      Mental Status: He is alert and oriented to person, place, and time. Mental status is at baseline.      Cranial Nerves: No cranial nerve deficit.      Motor: No weakness.      Gait: Gait normal.   Psychiatric:         Mood and Affect: Mood normal.         Behavior: Behavior normal.         Thought Content: Thought content normal.         Judgment: Judgment normal.         CRANIAL NERVES     CN III, IV, VI   Pupils are equal,  round, and reactive to light.     Significant Labs: All pertinent labs within the past 24 hours have been reviewed.  CBC:   Recent Labs   Lab 05/01/23  0407   WBC 5.9   HGB 8.3*   HCT 27.6*          CMP:   Recent Labs   Lab 05/01/23  0408   *   K 4.8   CO2 16*   BUN 30.0*   CREATININE 1.69*   CALCIUM 8.2*   ALBUMIN 2.2*   BILITOT 0.3   ALKPHOS 50   AST 23   ALT 17         Significant Imaging: I have reviewed all pertinent imaging results/findings within the past 24 hours.

## 2023-05-02 NOTE — PT/OT/SLP PROGRESS
Physical Therapy Treatment    Patient Name:  Claus Mcguire   MRN:  [id number]    Recommendations:     Discharge Recommendations:  skilled nursing facility  Discharge Equipment Recommendations:    Barriers to discharge:  medical status    Assessment:     Claus Mcguire is a 82 y.o. male admitted with a medical diagnosis of Dehydration.  He presents with the following impairments/functional limitations: weakness, impaired endurance, impaired self care skills, impaired functional mobility, gait instability.    Pt confused but pleasant. He required frequent cues for safety and initiation of tasks, slow to process at times. He did increased ambulation distance today however fatigues quickly and requires min-mod A. Encouraged pt to remain up in chair as tolerated throughout the day, reviewed exercises with family present.    Rehab Prognosis: Good; patient would benefit from acute skilled PT services to address these deficits and reach maximum level of function.    Recent Surgery: Procedure(s) (LRB):  EGD (ESOPHAGOGASTRODUODENOSCOPY) (N/A) 5 Days Post-Op    Plan:     During this hospitalization, patient to be seen daily to address the identified rehab impairments via gait training, therapeutic activities, therapeutic exercises and progress toward the following goals:    Plan of Care Expires:       Subjective     Chief Complaint: weakness  Patient/Family Comments/goals: increase mobility  Pain/Comfort:         Objective:     Communicated with family prior to session.  Patient found HOB elevated with   upon PT entry to room.     General Precautions: Standard,   Orthopedic Precautions:    Braces:    Respiratory Status: Room air     Functional Mobility:  Bed Mobility:     Supine to Sit: minimum assistance and moderate assistance  Transfers:     Sit to Stand:  minimum assistance and moderate assistance with rolling walker  Toilet Transfer: minimum assistance with  rolling walker and grab bars  using  Step Transfer  Gait: 2 x  35' with min-mod A RW, cues for guidance  Balance: mod A in standing      AM-PAC 6 CLICK MOBILITY          Treatment & Education:  See above    Patient left up in chair with all lines intact, call button in reach, chair alarm on, and family present.    GOALS:   Multidisciplinary Problems       Physical Therapy Goals          Problem: Physical Therapy    Goal Priority Disciplines Outcome Goal Variances Interventions   Physical Therapy Goal     PT, PT/OT Ongoing, Progressing                         Time Tracking:     PT Received On:  05/02/2023  PT Start Time:  1045     PT Stop Time:  1110  PT Total Time (min):   25 min    Billable Minutes: Gait Training 15 and Therapeutic Activity 10     PTA              05/02/2023

## 2023-05-02 NOTE — PROGRESS NOTES
[facility]  [facility] Medicine  Progress Note    Patient Name: Claus Mcguire  MRN: [id number]  Patient Class: IP- Inpatient   Admission Date: 4/26/2023  Length of Stay: 5 days  Attending Physician: Juan Quispe MD  Primary Care Provider: Juan Quispe MD        Subjective:     Principal Problem:Dehydration        HPI:  The 82 year old known to me from clinic.  Presented with 3 days of weakness some nausea but no vomiting no recent travel no questionable food.  Labs were done today which showed increased BUN and creatinine and decreased sodium and chloride.  Both consistent with dehydration.  Also noted was a decrease in hemoglobin hematocrit 11 hemoglobin down to 8.1 in last 6 months.  Patient is weak at this time.  We are admitting the patient for IV fluid hydration workup of his diarrhea and workup of his acute anemia.      Overview/Hospital Course:  Hemoglobin is  stable,  but stool is dark  Nausea has resolved  No abdominal pain today  No sob, is coughing and c/o upper respiratory congestion with clear sputum has improved  No cp  No diarrhea since admit  Has some confusion   Tolerating po well      hypertension    No past surgical history    Review of patient's allergies indicates:   Allergen Reactions    Penicillins Swelling       No current facility-administered medications on file prior to encounter.     Current Outpatient Medications on File Prior to Encounter   Medication Sig    amLODIPine (NORVASC) 10 MG tablet Take 10 mg by mouth every evening.    hydrALAZINE (APRESOLINE) 25 MG tablet Take 25 mg by mouth 2 (two) times daily.    meloxicam (MOBIC) 15 MG tablet Take 15 mg by mouth.    nebivoloL (BYSTOLIC) 5 MG Tab Take 5 mg by mouth.    olmesartan (BENICAR) 40 MG tablet Take 40 mg by mouth.    spironolactone-hydrochlorothiazide 25-25mg (ALDACTAZIDE) 25-25 mg Tab Take 1 tablet by mouth.    tamsulosin (FLOMAX) 0.4 mg Cap Take 1 capsule by mouth every  evening.     Family History    None       Tobacco Use    Smoking status: Former     Packs/day: 2.00     Years: 1.00     Pack years: 2.00     Types: Cigarettes    Smokeless tobacco: Current     Types: Chew   Substance and Sexual Activity    Alcohol use: Yes     Alcohol/week: 6.0 standard drinks     Types: 6 Cans of beer per week    Drug use: Never    Sexual activity: Not Currently     Review of Systems   Constitutional:  Positive for appetite change and fatigue. Negative for fever.   HENT:  Positive for congestion and rhinorrhea.    Eyes: Negative.    Respiratory:  Positive for cough.    Cardiovascular: Negative.    Gastrointestinal:  Positive for diarrhea and nausea. Negative for vomiting.   Endocrine: Negative.    Genitourinary: Negative.    Musculoskeletal:  Positive for back pain.   Skin:  Positive for rash (to the buttocks).   Allergic/Immunologic: Negative.    Neurological: Negative.    Hematological: Negative.    Psychiatric/Behavioral: Negative.     Objective:     Vital Signs (Most Recent):  Temp: 98.3 °F (36.8 °C) (05/02/23 0419)  Pulse: (!) 52 (05/02/23 0732)  Resp: 18 (05/02/23 0732)  BP: 134/63 (05/02/23 0419)  SpO2: (!) 92 % (05/02/23 0732)   Vital Signs (24h Range):  Temp:  [97.3 °F (36.3 °C)-99.3 °F (37.4 °C)] 98.3 °F (36.8 °C)  Pulse:  [51-84] 52  Resp:  [18-20] 18  SpO2:  [92 %-95 %] 92 %  BP: (116-148)/(51-63) 134/63     Weight: 80 kg (176 lb 5.9 oz)  Body mass index is 27.62 kg/m².    Physical Exam  Constitutional:       Appearance: Normal appearance. He is normal weight. He is ill-appearing.   HENT:      Head: Normocephalic and atraumatic.      Left Ear: Tympanic membrane normal.      Nose: Congestion and rhinorrhea present.      Mouth/Throat:      Mouth: Mucous membranes are moist.      Pharynx: Oropharynx is clear. No oropharyngeal exudate.   Eyes:      Extraocular Movements: Extraocular movements intact.      Conjunctiva/sclera: Conjunctivae normal.      Pupils: Pupils are equal, round,  and reactive to light.   Cardiovascular:      Rate and Rhythm: Normal rate and regular rhythm.      Pulses: Normal pulses.      Heart sounds: Normal heart sounds. No murmur heard.    No gallop.   Pulmonary:      Effort: Pulmonary effort is normal.      Breath sounds: Normal breath sounds.   Abdominal:      General: Abdomen is flat. Bowel sounds are normal. There is no distension.      Palpations: Abdomen is soft.   Genitourinary:     Penis: Normal.    Musculoskeletal:         General: Normal range of motion.      Cervical back: Normal range of motion and neck supple.      Right lower leg: No edema.   Skin:     General: Skin is warm and dry.      Comments: Erythematous rash to the buttocks   Neurological:      General: No focal deficit present.      Mental Status: He is alert and oriented to person, place, and time. Mental status is at baseline.      Cranial Nerves: No cranial nerve deficit.      Motor: No weakness.      Gait: Gait normal.   Psychiatric:         Mood and Affect: Mood normal.         Behavior: Behavior normal.         Thought Content: Thought content normal.         Judgment: Judgment normal.         CRANIAL NERVES     CN III, IV, VI   Pupils are equal, round, and reactive to light.     Significant Labs: All pertinent labs within the past 24 hours have been reviewed.  CBC:   Recent Labs   Lab 05/01/23  0407   WBC 5.9   HGB 8.3*   HCT 27.6*          CMP:   Recent Labs   Lab 05/01/23  0408   *   K 4.8   CO2 16*   BUN 30.0*   CREATININE 1.69*   CALCIUM 8.2*   ALBUMIN 2.2*   BILITOT 0.3   ALKPHOS 50   AST 23   ALT 17         Significant Imaging: I have reviewed all pertinent imaging results/findings within the past 24 hours.      Assessment/Plan:      * Dehydration  contiunue iv fluids.     Follow cmp and h/h  Fluids  To ns at 75cc now that taking po    Infiltrate noted on imaging study    Continue levaquin    Agitation    I tim pect underlying dementia compliacated by new surroundings and  illness  Continue zyprexa at night    Chronic gastric ulcer with hemorrhage  contiue to feed ,   carafate and iv ppi      Clostridium difficile infection  continue metronidazole  Lift isolation    Duodenal ulcer    continue protonoix drip  carafate tid  Tolerating regular diet    Acute blood loss anemia    Improved after transfusion  Follow h/h continue iron    Essential hypertension  Continue home meds and hydralazine prn, clonidine patch    Weakness    I v fluids.   likely due to dehydration and anemia  Continue pt  Will seek snf placement    Diarrhea of presumed infectious origin    Metronidazole for c-diff  Resolved will remove isolation      VTE Risk Mitigation (From admission, onward)         Ordered     IP VTE LOW RISK PATIENT  Once         04/26/23 1538     Place sequential compression device  Until discontinued         04/26/23 1538                Discharge Planning   KB:      Code Status: Full Code   Is the patient medically ready for discharge?:     Reason for patient still in hospital (select all that apply): Patient trending condition  Discharge Plan A: Home Health                  Juan Quispe MD  Department of Hospital Medicine   [facility]

## 2023-05-02 NOTE — ASSESSMENT & PLAN NOTE
I tim shelton underlying dementia compliacated by new surroundings and illness  Continue zyprexa at night

## 2023-05-03 ENCOUNTER — ANESTHESIA (OUTPATIENT)
Dept: SURGERY | Facility: HOSPITAL | Age: 83
DRG: 177 | End: 2023-05-03
Payer: MEDICARE

## 2023-05-03 ENCOUNTER — ANESTHESIA EVENT (OUTPATIENT)
Dept: SURGERY | Facility: HOSPITAL | Age: 83
DRG: 177 | End: 2023-05-03
Payer: MEDICARE

## 2023-05-03 LAB
ABO + RH BLD: NORMAL
ABO + RH BLD: NORMAL
ALBUMIN SERPL-MCNC: 2.1 G/DL (ref 3.4–4.8)
ALBUMIN/GLOB SERPL: 0.7 RATIO (ref 1.1–2)
ALP SERPL-CCNC: 56 UNIT/L (ref 40–150)
ALT SERPL-CCNC: 18 UNIT/L (ref 0–55)
AST SERPL-CCNC: 22 UNIT/L (ref 5–34)
BASOPHILS # BLD AUTO: 0.01 X10(3)/MCL
BASOPHILS # BLD AUTO: 0.02 X10(3)/MCL
BASOPHILS NFR BLD AUTO: 0.2 %
BASOPHILS NFR BLD AUTO: 0.3 %
BILIRUBIN DIRECT+TOT PNL SERPL-MCNC: 0.2 MG/DL
BLD PROD TYP BPU: NORMAL
BLD PROD TYP BPU: NORMAL
BLOOD UNIT EXPIRATION DATE: NORMAL
BLOOD UNIT EXPIRATION DATE: NORMAL
BLOOD UNIT TYPE CODE: 5100
BLOOD UNIT TYPE CODE: 5100
BUN SERPL-MCNC: 24 MG/DL (ref 8.4–25.7)
CALCIUM SERPL-MCNC: 8.2 MG/DL (ref 8.8–10)
CHLORIDE SERPL-SCNC: 114 MMOL/L (ref 98–107)
CO2 SERPL-SCNC: 17 MMOL/L (ref 23–31)
CREAT SERPL-MCNC: 1.37 MG/DL (ref 0.73–1.18)
CROSSMATCH INTERPRETATION: NORMAL
CROSSMATCH INTERPRETATION: NORMAL
DISPENSE STATUS: NORMAL
DISPENSE STATUS: NORMAL
EOSINOPHIL # BLD AUTO: 0.1 X10(3)/MCL (ref 0–0.9)
EOSINOPHIL # BLD AUTO: 0.14 X10(3)/MCL (ref 0–0.9)
EOSINOPHIL NFR BLD AUTO: 1.3 %
EOSINOPHIL NFR BLD AUTO: 2.2 %
ERYTHROCYTE [DISTWIDTH] IN BLOOD BY AUTOMATED COUNT: 14.7 % (ref 11.5–17)
ERYTHROCYTE [DISTWIDTH] IN BLOOD BY AUTOMATED COUNT: 14.7 % (ref 11.5–17)
GFR SERPLBLD CREATININE-BSD FMLA CKD-EPI: 52 MLS/MIN/1.73/M2
GLOBULIN SER-MCNC: 3.2 GM/DL (ref 2.4–3.5)
GLUCOSE SERPL-MCNC: 108 MG/DL (ref 82–115)
GROUP & RH: NORMAL
HCT VFR BLD AUTO: 25.7 % (ref 42–52)
HCT VFR BLD AUTO: 28.6 % (ref 42–52)
HGB BLD-MCNC: 7.7 G/DL (ref 14–18)
HGB BLD-MCNC: 9.1 G/DL (ref 14–18)
IMM GRANULOCYTES # BLD AUTO: 0.06 X10(3)/MCL (ref 0–0.04)
IMM GRANULOCYTES # BLD AUTO: 0.06 X10(3)/MCL (ref 0–0.04)
IMM GRANULOCYTES NFR BLD AUTO: 0.8 %
IMM GRANULOCYTES NFR BLD AUTO: 0.9 %
INDIRECT COOMBS GEL: NORMAL
LYMPHOCYTES # BLD AUTO: 0.55 X10(3)/MCL (ref 0.6–4.6)
LYMPHOCYTES # BLD AUTO: 0.99 X10(3)/MCL (ref 0.6–4.6)
LYMPHOCYTES NFR BLD AUTO: 15.4 %
LYMPHOCYTES NFR BLD AUTO: 7.1 %
MCH RBC QN AUTO: 26.4 PG (ref 27–31)
MCH RBC QN AUTO: 27.8 PG (ref 27–31)
MCHC RBC AUTO-ENTMCNC: 30 G/DL (ref 33–36)
MCHC RBC AUTO-ENTMCNC: 31.8 G/DL (ref 33–36)
MCV RBC AUTO: 87.5 FL (ref 80–94)
MCV RBC AUTO: 88 FL (ref 80–94)
MONOCYTES # BLD AUTO: 0.84 X10(3)/MCL (ref 0.1–1.3)
MONOCYTES # BLD AUTO: 0.88 X10(3)/MCL (ref 0.1–1.3)
MONOCYTES NFR BLD AUTO: 11.4 %
MONOCYTES NFR BLD AUTO: 13 %
NEUTROPHILS # BLD AUTO: 4.4 X10(3)/MCL (ref 2.1–9.2)
NEUTROPHILS # BLD AUTO: 6.14 X10(3)/MCL (ref 2.1–9.2)
NEUTROPHILS NFR BLD AUTO: 68.3 %
NEUTROPHILS NFR BLD AUTO: 79.1 %
PLATELET # BLD AUTO: 259 X10(3)/MCL (ref 130–400)
PLATELET # BLD AUTO: 299 X10(3)/MCL (ref 130–400)
PMV BLD AUTO: 10 FL (ref 7.4–10.4)
PMV BLD AUTO: 10.1 FL (ref 7.4–10.4)
POTASSIUM SERPL-SCNC: 4.1 MMOL/L (ref 3.5–5.1)
PROT SERPL-MCNC: 5.3 GM/DL (ref 5.8–7.6)
RBC # BLD AUTO: 2.92 X10(6)/MCL (ref 4.7–6.1)
RBC # BLD AUTO: 3.27 X10(6)/MCL (ref 4.7–6.1)
SODIUM SERPL-SCNC: 138 MMOL/L (ref 136–145)
SPECIMEN OUTDATE: NORMAL
UNIT NUMBER: NORMAL
UNIT NUMBER: NORMAL
WBC # SPEC AUTO: 6.44 X10(3)/MCL (ref 4.5–11.5)
WBC # SPEC AUTO: 7.75 X10(3)/MCL (ref 4.5–11.5)

## 2023-05-03 PROCEDURE — 88313 SPECIAL STAINS GROUP 2: CPT

## 2023-05-03 PROCEDURE — D9220A PRA ANESTHESIA: ICD-10-PCS | Mod: ,,, | Performed by: NURSE ANESTHETIST, CERTIFIED REGISTERED

## 2023-05-03 PROCEDURE — 93005 ELECTROCARDIOGRAM TRACING: CPT

## 2023-05-03 PROCEDURE — 88312 SPECIAL STAINS GROUP 1: CPT

## 2023-05-03 PROCEDURE — 86900 BLOOD TYPING SEROLOGIC ABO: CPT | Performed by: FAMILY MEDICINE

## 2023-05-03 PROCEDURE — 25000003 PHARM REV CODE 250: Performed by: NURSE ANESTHETIST, CERTIFIED REGISTERED

## 2023-05-03 PROCEDURE — 85025 COMPLETE CBC W/AUTO DIFF WBC: CPT | Performed by: FAMILY MEDICINE

## 2023-05-03 PROCEDURE — 94640 AIRWAY INHALATION TREATMENT: CPT

## 2023-05-03 PROCEDURE — 37000009 HC ANESTHESIA EA ADD 15 MINS: Performed by: SURGERY

## 2023-05-03 PROCEDURE — 63600175 PHARM REV CODE 636 W HCPCS: Performed by: NURSE ANESTHETIST, CERTIFIED REGISTERED

## 2023-05-03 PROCEDURE — 27201423 OPTIME MED/SURG SUP & DEVICES STERILE SUPPLY: Performed by: SURGERY

## 2023-05-03 PROCEDURE — 97530 THERAPEUTIC ACTIVITIES: CPT | Mod: CQ

## 2023-05-03 PROCEDURE — 43239 EGD BIOPSY SINGLE/MULTIPLE: CPT | Performed by: SURGERY

## 2023-05-03 PROCEDURE — C9113 INJ PANTOPRAZOLE SODIUM, VIA: HCPCS | Performed by: SURGERY

## 2023-05-03 PROCEDURE — 37000008 HC ANESTHESIA 1ST 15 MINUTES: Performed by: SURGERY

## 2023-05-03 PROCEDURE — 93010 EKG 12-LEAD: ICD-10-PCS | Mod: ,,, | Performed by: INTERNAL MEDICINE

## 2023-05-03 PROCEDURE — 63600175 PHARM REV CODE 636 W HCPCS: Performed by: FAMILY MEDICINE

## 2023-05-03 PROCEDURE — 94799 UNLISTED PULMONARY SVC/PX: CPT

## 2023-05-03 PROCEDURE — 25000003 PHARM REV CODE 250: Performed by: SURGERY

## 2023-05-03 PROCEDURE — P9016 RBC LEUKOCYTES REDUCED: HCPCS | Performed by: FAMILY MEDICINE

## 2023-05-03 PROCEDURE — 93010 ELECTROCARDIOGRAM REPORT: CPT | Mod: ,,, | Performed by: INTERNAL MEDICINE

## 2023-05-03 PROCEDURE — 86920 COMPATIBILITY TEST SPIN: CPT | Performed by: FAMILY MEDICINE

## 2023-05-03 PROCEDURE — 11000001 HC ACUTE MED/SURG PRIVATE ROOM

## 2023-05-03 PROCEDURE — 25000242 PHARM REV CODE 250 ALT 637 W/ HCPCS: Performed by: FAMILY MEDICINE

## 2023-05-03 PROCEDURE — 63600175 PHARM REV CODE 636 W HCPCS: Performed by: SURGERY

## 2023-05-03 PROCEDURE — 99900035 HC TECH TIME PER 15 MIN (STAT)

## 2023-05-03 PROCEDURE — 80053 COMPREHEN METABOLIC PANEL: CPT | Performed by: FAMILY MEDICINE

## 2023-05-03 PROCEDURE — 97116 GAIT TRAINING THERAPY: CPT | Mod: CQ

## 2023-05-03 PROCEDURE — D9220A PRA ANESTHESIA: Mod: ,,, | Performed by: NURSE ANESTHETIST, CERTIFIED REGISTERED

## 2023-05-03 PROCEDURE — 36430 TRANSFUSION BLD/BLD COMPNT: CPT

## 2023-05-03 PROCEDURE — 94761 N-INVAS EAR/PLS OXIMETRY MLT: CPT

## 2023-05-03 PROCEDURE — A4216 STERILE WATER/SALINE, 10 ML: HCPCS | Performed by: FAMILY MEDICINE

## 2023-05-03 PROCEDURE — S0030 INJECTION, METRONIDAZOLE: HCPCS | Performed by: FAMILY MEDICINE

## 2023-05-03 PROCEDURE — 27000221 HC OXYGEN, UP TO 24 HOURS

## 2023-05-03 PROCEDURE — 25000003 PHARM REV CODE 250: Performed by: FAMILY MEDICINE

## 2023-05-03 PROCEDURE — 88305 TISSUE EXAM BY PATHOLOGIST: CPT | Performed by: SURGERY

## 2023-05-03 RX ORDER — LIDOCAINE HYDROCHLORIDE 20 MG/ML
INJECTION, SOLUTION EPIDURAL; INFILTRATION; INTRACAUDAL; PERINEURAL
Status: DISCONTINUED | OUTPATIENT
Start: 2023-05-03 | End: 2023-05-03

## 2023-05-03 RX ORDER — LIDOCAINE HYDROCHLORIDE 20 MG/ML
SOLUTION OROPHARYNGEAL
Status: DISCONTINUED | OUTPATIENT
Start: 2023-05-03 | End: 2023-05-03

## 2023-05-03 RX ORDER — PROPOFOL 10 MG/ML
VIAL (ML) INTRAVENOUS
Status: DISCONTINUED | OUTPATIENT
Start: 2023-05-03 | End: 2023-05-03

## 2023-05-03 RX ORDER — HYDROCODONE BITARTRATE AND ACETAMINOPHEN 500; 5 MG/1; MG/1
TABLET ORAL
Status: DISCONTINUED | OUTPATIENT
Start: 2023-05-03 | End: 2023-05-08 | Stop reason: HOSPADM

## 2023-05-03 RX ORDER — FUROSEMIDE 10 MG/ML
40 INJECTION INTRAMUSCULAR; INTRAVENOUS ONCE
Status: COMPLETED | OUTPATIENT
Start: 2023-05-03 | End: 2023-05-03

## 2023-05-03 RX ADMIN — METRONIDAZOLE 500 MG: 5 INJECTION, SOLUTION INTRAVENOUS at 06:05

## 2023-05-03 RX ADMIN — IPRATROPIUM BROMIDE AND ALBUTEROL SULFATE 3 ML: .5; 3 SOLUTION RESPIRATORY (INHALATION) at 07:05

## 2023-05-03 RX ADMIN — LEVOFLOXACIN 500 MG: 500 INJECTION, SOLUTION INTRAVENOUS at 10:05

## 2023-05-03 RX ADMIN — SODIUM CHLORIDE, PRESERVATIVE FREE 10 ML: 5 INJECTION INTRAVENOUS at 06:05

## 2023-05-03 RX ADMIN — LIDOCAINE HYDROCHLORIDE 5 ML: 20 INJECTION, SOLUTION EPIDURAL; INFILTRATION; INTRACAUDAL; PERINEURAL at 03:05

## 2023-05-03 RX ADMIN — MICONAZOLE NITRATE: 20 CREAM TOPICAL at 09:05

## 2023-05-03 RX ADMIN — AMLODIPINE BESYLATE 10 MG: 10 TABLET ORAL at 09:05

## 2023-05-03 RX ADMIN — NEBIVOLOL 10 MG: 5 TABLET ORAL at 10:05

## 2023-05-03 RX ADMIN — SODIUM CHLORIDE, PRESERVATIVE FREE 10 ML: 5 INJECTION INTRAVENOUS at 12:05

## 2023-05-03 RX ADMIN — HYDROCHLOROTHIAZIDE 25 MG: 25 TABLET ORAL at 10:05

## 2023-05-03 RX ADMIN — HYDRALAZINE HYDROCHLORIDE 25 MG: 25 TABLET, FILM COATED ORAL at 09:05

## 2023-05-03 RX ADMIN — METRONIDAZOLE 500 MG: 5 INJECTION, SOLUTION INTRAVENOUS at 10:05

## 2023-05-03 RX ADMIN — PANTOPRAZOLE SODIUM 8 MG/HR: 40 INJECTION, POWDER, FOR SOLUTION INTRAVENOUS at 07:05

## 2023-05-03 RX ADMIN — NEBIVOLOL 10 MG: 5 TABLET ORAL at 09:05

## 2023-05-03 RX ADMIN — SPIRONOLACTONE 25 MG: 25 TABLET ORAL at 10:05

## 2023-05-03 RX ADMIN — SUCRALFATE ORAL 1 G: 1 SUSPENSION ORAL at 09:05

## 2023-05-03 RX ADMIN — METRONIDAZOLE 500 MG: 5 INJECTION, SOLUTION INTRAVENOUS at 01:05

## 2023-05-03 RX ADMIN — LIDOCAINE HYDROCHLORIDE 15 ML: 20 SOLUTION ORAL at 03:05

## 2023-05-03 RX ADMIN — PANTOPRAZOLE SODIUM 8 MG/HR: 40 INJECTION, POWDER, FOR SOLUTION INTRAVENOUS at 06:05

## 2023-05-03 RX ADMIN — PROPOFOL 50 MG: 10 INJECTION, EMULSION INTRAVENOUS at 03:05

## 2023-05-03 RX ADMIN — OLANZAPINE 15 MG: 5 TABLET, FILM COATED ORAL at 09:05

## 2023-05-03 RX ADMIN — FUROSEMIDE 40 MG: 10 INJECTION, SOLUTION INTRAMUSCULAR; INTRAVENOUS at 08:05

## 2023-05-03 RX ADMIN — PANTOPRAZOLE SODIUM 8 MG/HR: 40 INJECTION, POWDER, FOR SOLUTION INTRAVENOUS at 02:05

## 2023-05-03 RX ADMIN — PANTOPRAZOLE SODIUM 8 MG/HR: 40 INJECTION, POWDER, FOR SOLUTION INTRAVENOUS at 01:05

## 2023-05-03 RX ADMIN — TAMSULOSIN HYDROCHLORIDE 0.4 MG: 0.4 CAPSULE ORAL at 09:05

## 2023-05-03 RX ADMIN — HYDRALAZINE HYDROCHLORIDE 25 MG: 25 TABLET, FILM COATED ORAL at 10:05

## 2023-05-03 NOTE — ANESTHESIA POSTPROCEDURE EVALUATION
Anesthesia Post Evaluation    Patient: Claus Mcguire    Procedure(s) Performed: Procedure(s) (LRB):  EGD (ESOPHAGOGASTRODUODENOSCOPY) (N/A)  EGD, WITH CLOSED BIOPSY (N/A)    Final Anesthesia Type: general      Patient location during evaluation: floor  Patient participation: Yes- Able to Participate  Level of consciousness: awake and alert  Post-procedure vital signs: reviewed and stable  Pain management: adequate  Airway patency: patent  TEMITOPE mitigation strategies: Multimodal analgesia  PONV status at discharge: No PONV  Anesthetic complications: no      Cardiovascular status: blood pressure returned to baseline  Respiratory status: unassisted  Hydration status: euvolemic  Follow-up not needed.          Vitals Value Taken Time   /56 05/03/23 1355   Temp 36.6 °C (97.9 °F) 05/03/23 1032   Pulse 60 05/03/23 1356   Resp 20 05/03/23 1026   SpO2 95 % 05/03/23 1356         No case tracking events are documented in the log.      Pain/Pavan Score: No data recorded

## 2023-05-03 NOTE — SUBJECTIVE & OBJECTIVE
hypertension    No past surgical history    Review of patient's allergies indicates:   Allergen Reactions    Penicillins Swelling       No current facility-administered medications on file prior to encounter.     Current Outpatient Medications on File Prior to Encounter   Medication Sig    amLODIPine (NORVASC) 10 MG tablet Take 10 mg by mouth every evening.    hydrALAZINE (APRESOLINE) 25 MG tablet Take 25 mg by mouth 2 (two) times daily.    meloxicam (MOBIC) 15 MG tablet Take 15 mg by mouth.    nebivoloL (BYSTOLIC) 5 MG Tab Take 5 mg by mouth.    olmesartan (BENICAR) 40 MG tablet Take 40 mg by mouth.    spironolactone-hydrochlorothiazide 25-25mg (ALDACTAZIDE) 25-25 mg Tab Take 1 tablet by mouth.    tamsulosin (FLOMAX) 0.4 mg Cap Take 1 capsule by mouth every evening.     Family History    None       Tobacco Use    Smoking status: Former     Packs/day: 2.00     Years: 1.00     Pack years: 2.00     Types: Cigarettes    Smokeless tobacco: Current     Types: Chew   Substance and Sexual Activity    Alcohol use: Yes     Alcohol/week: 6.0 standard drinks     Types: 6 Cans of beer per week    Drug use: Never    Sexual activity: Not Currently     Review of Systems   Constitutional:  Positive for fatigue. Negative for appetite change and fever.   HENT:  Positive for congestion and rhinorrhea.    Eyes: Negative.    Respiratory:  Positive for cough.    Cardiovascular: Negative.    Gastrointestinal:  Negative for diarrhea, nausea and vomiting.   Endocrine: Negative.    Genitourinary: Negative.    Musculoskeletal:  Negative for back pain.   Skin:  Positive for rash (to the buttocks).   Allergic/Immunologic: Negative.    Neurological: Negative.    Hematological: Negative.    Psychiatric/Behavioral: Negative.     Objective:     Vital Signs (Most Recent):  Temp: 98.1 °F (36.7 °C) (05/03/23 0437)  Pulse: (!) 51 (05/03/23 0730)  Resp: 20 (05/03/23 0730)  BP: (!) 139/57 (05/03/23 0437)  SpO2: (!) 92 % (05/03/23 0730)   Vital Signs (24h  Range):  Temp:  [97.3 °F (36.3 °C)-98.5 °F (36.9 °C)] 98.1 °F (36.7 °C)  Pulse:  [51-58] 51  Resp:  [18-20] 20  SpO2:  [92 %-95 %] 92 %  BP: (122-151)/(42-65) 139/57     Weight: 80 kg (176 lb 5.9 oz)  Body mass index is 27.62 kg/m².    Physical Exam  Constitutional:       Appearance: Normal appearance. He is normal weight. He is ill-appearing.   HENT:      Head: Normocephalic and atraumatic.      Left Ear: Tympanic membrane normal.      Nose: Congestion and rhinorrhea present.      Mouth/Throat:      Mouth: Mucous membranes are moist.      Pharynx: Oropharynx is clear. No oropharyngeal exudate.   Eyes:      Extraocular Movements: Extraocular movements intact.      Conjunctiva/sclera: Conjunctivae normal.      Pupils: Pupils are equal, round, and reactive to light.   Cardiovascular:      Rate and Rhythm: Normal rate and regular rhythm.      Pulses: Normal pulses.      Heart sounds: Normal heart sounds. No murmur heard.    No gallop.   Pulmonary:      Effort: Pulmonary effort is normal.      Breath sounds: Normal breath sounds.   Abdominal:      General: Abdomen is flat. Bowel sounds are normal. There is no distension.      Palpations: Abdomen is soft.   Genitourinary:     Penis: Normal.    Musculoskeletal:         General: Normal range of motion.      Cervical back: Normal range of motion and neck supple.      Right lower leg: No edema.   Skin:     General: Skin is warm and dry.      Comments: Erythematous rash to the buttocks   Neurological:      General: No focal deficit present.      Mental Status: He is alert and oriented to person, place, and time. Mental status is at baseline.      Cranial Nerves: No cranial nerve deficit.      Motor: No weakness.      Gait: Gait normal.   Psychiatric:         Mood and Affect: Mood normal.         Behavior: Behavior normal.         Thought Content: Thought content normal.         Judgment: Judgment normal.         CRANIAL NERVES     CN III, IV, VI   Pupils are equal, round, and  reactive to light.     Significant Labs: All pertinent labs within the past 24 hours have been reviewed.  CBC:   Recent Labs   Lab 05/02/23  0749 05/03/23  0436   WBC 6.16 6.44   HGB 8.2* 7.7*   HCT 26.9* 25.7*    259       CMP:   Recent Labs   Lab 05/02/23  0750 05/03/23  0436    138   K 4.9 4.1   CO2 15* 17*   BUN 31.0* 24.0   CREATININE 1.61* 1.37*   CALCIUM 8.4* 8.2*   ALBUMIN 2.2* 2.1*   BILITOT 0.2 0.2   ALKPHOS 56 56   AST 32 22   ALT 20 18         Significant Imaging: I have reviewed all pertinent imaging results/findings within the past 24 hours.

## 2023-05-03 NOTE — ASSESSMENT & PLAN NOTE
Improved after transfusion initially, has decreased hemoglobin to 7.7  Follow h/h continue iron  Transfuse 2 units now with 40 mg lasix between each  Dr jeffers will scope again today  Npo now

## 2023-05-03 NOTE — OP NOTE
Procedure date:  05/03/2023      Indications:  82-year-old white male previously undergoing diagnostic EGD with findings of peptic ulcer disease and duodenal ulcer.  Having been monitored with a slowed dropping H&H undergoing repeat EGD for re-evaluation to ensure no active blood loss.      Preoperative diagnosis:  1. Dehydration 2. C diff infection 3. Peptic ulcer disease with duodenal ulcer    Postoperative diagnosis:  1. Dehydration 2. C diff infection 3. Peptic ulcer disease with duodenal ulcer    Procedure performed: EGD with biopsy     Procedure in detail:  Patient was brought to the endoscopy suite laid in a slight supine position.  Intravenous anesthesia was provided.  Oral bite plate placed in the mouth.  An endoscope was then passed through the oropharynx intubating the esophagus with transit into the stomach.  Upon entering the stomach was fully insufflated.  There was no signs of bleeding within the gastric lumen.  The scope was then advanced into duodenum encountering the previously identified ulcer.  Pictures were taken.  There was no exposed blood vessel or clot identified.  There was no refluxing blood within the region.  It appeared to be stable and improved since the previous EGD.  The scope was then withdrawn back into the stomach and biopsies were performed of the gastric antrum for histologic and micro evaluation.  Scope was retroflexed revealing normal-appearing cardia fundus and proximal body.  It was returned to neutral position the stomach was evacuated of air.  Scope was then withdrawn.  The GE junction appeared to be grossly normal the remainder of the esophagus appeared to be grossly normal.  Patient was then relieved of anesthesia stable condition and transferred to postanesthesia care unit.      Complications:  None   Estimated blood loss:  2 cc   Specimens:  Gastric antrum for histologic and micro evaluation     Disposition:  Upon recovery from anesthesia patient will be transferred  back to the floor for continuous medical management    Lisa Ramesh MD

## 2023-05-03 NOTE — PLAN OF CARE
Not being d/c to [facility] today due to drop in H&H and pt needing procedure tomorrow by Dr. Ramesh.  Charge nurse and primary nurse aware.    Pt is a new SNF placement and Eric Reina has all info and PSSR/142 already.  I notified Reina that pt will not d/c today due to a procedure and also noted that I will be out rest of the week and that she needs to call CM covering for me tomorrow and see if pt is ready for d/c.  She verbalized understanding.

## 2023-05-03 NOTE — PROGRESS NOTES
[facility]  [facility] Medicine  Progress Note    Patient Name: Claus Mcguire  MRN: [id number]  Patient Class: IP- Inpatient   Admission Date: 4/26/2023  Length of Stay: 6 days  Attending Physician: Juan Quispe MD  Primary Care Provider: Juan Quispe MD        Subjective:     Principal Problem:Dehydration        HPI:  The 82 year old known to me from clinic.  Presented with 3 days of weakness some nausea but no vomiting no recent travel no questionable food.  Labs were done today which showed increased BUN and creatinine and decreased sodium and chloride.  Both consistent with dehydration.  Also noted was a decrease in hemoglobin hematocrit 11 hemoglobin down to 8.1 in last 6 months.  Patient is weak at this time.  We are admitting the patient for IV fluid hydration workup of his diarrhea and workup of his acute anemia.      Overview/Hospital Course:  Hemoglobin decreased today to 7.7,  but stool is dark  Nausea has resolved  No abdominal pain today  No sob, is coughing and c/o upper respiratory congestion with clear sputum has improved  No cp  No diarrhea since admit  Has some confusion   Tolerating po well  Sleeping better      hypertension    No past surgical history    Review of patient's allergies indicates:   Allergen Reactions    Penicillins Swelling       No current facility-administered medications on file prior to encounter.     Current Outpatient Medications on File Prior to Encounter   Medication Sig    amLODIPine (NORVASC) 10 MG tablet Take 10 mg by mouth every evening.    hydrALAZINE (APRESOLINE) 25 MG tablet Take 25 mg by mouth 2 (two) times daily.    meloxicam (MOBIC) 15 MG tablet Take 15 mg by mouth.    nebivoloL (BYSTOLIC) 5 MG Tab Take 5 mg by mouth.    olmesartan (BENICAR) 40 MG tablet Take 40 mg by mouth.    spironolactone-hydrochlorothiazide 25-25mg (ALDACTAZIDE) 25-25 mg Tab Take 1 tablet by mouth.    tamsulosin (FLOMAX) 0.4 mg Cap Take 1  capsule by mouth every evening.     Family History    None       Tobacco Use    Smoking status: Former     Packs/day: 2.00     Years: 1.00     Pack years: 2.00     Types: Cigarettes    Smokeless tobacco: Current     Types: Chew   Substance and Sexual Activity    Alcohol use: Yes     Alcohol/week: 6.0 standard drinks     Types: 6 Cans of beer per week    Drug use: Never    Sexual activity: Not Currently     Review of Systems   Constitutional:  Positive for fatigue. Negative for appetite change and fever.   HENT:  Positive for congestion and rhinorrhea.    Eyes: Negative.    Respiratory:  Positive for cough.    Cardiovascular: Negative.    Gastrointestinal:  Negative for diarrhea, nausea and vomiting.   Endocrine: Negative.    Genitourinary: Negative.    Musculoskeletal:  Negative for back pain.   Skin:  Positive for rash (to the buttocks).   Allergic/Immunologic: Negative.    Neurological: Negative.    Hematological: Negative.    Psychiatric/Behavioral: Negative.     Objective:     Vital Signs (Most Recent):  Temp: 98.1 °F (36.7 °C) (05/03/23 0437)  Pulse: (!) 51 (05/03/23 0730)  Resp: 20 (05/03/23 0730)  BP: (!) 139/57 (05/03/23 0437)  SpO2: (!) 92 % (05/03/23 0730)   Vital Signs (24h Range):  Temp:  [97.3 °F (36.3 °C)-98.5 °F (36.9 °C)] 98.1 °F (36.7 °C)  Pulse:  [51-58] 51  Resp:  [18-20] 20  SpO2:  [92 %-95 %] 92 %  BP: (122-151)/(42-65) 139/57     Weight: 80 kg (176 lb 5.9 oz)  Body mass index is 27.62 kg/m².    Physical Exam  Constitutional:       Appearance: Normal appearance. He is normal weight. He is ill-appearing.   HENT:      Head: Normocephalic and atraumatic.      Left Ear: Tympanic membrane normal.      Nose: Congestion and rhinorrhea present.      Mouth/Throat:      Mouth: Mucous membranes are moist.      Pharynx: Oropharynx is clear. No oropharyngeal exudate.   Eyes:      Extraocular Movements: Extraocular movements intact.      Conjunctiva/sclera: Conjunctivae normal.      Pupils: Pupils are  equal, round, and reactive to light.   Cardiovascular:      Rate and Rhythm: Normal rate and regular rhythm.      Pulses: Normal pulses.      Heart sounds: Normal heart sounds. No murmur heard.    No gallop.   Pulmonary:      Effort: Pulmonary effort is normal.      Breath sounds: Normal breath sounds.   Abdominal:      General: Abdomen is flat. Bowel sounds are normal. There is no distension.      Palpations: Abdomen is soft.   Genitourinary:     Penis: Normal.    Musculoskeletal:         General: Normal range of motion.      Cervical back: Normal range of motion and neck supple.      Right lower leg: No edema.   Skin:     General: Skin is warm and dry.      Comments: Erythematous rash to the buttocks   Neurological:      General: No focal deficit present.      Mental Status: He is alert and oriented to person, place, and time. Mental status is at baseline.      Cranial Nerves: No cranial nerve deficit.      Motor: No weakness.      Gait: Gait normal.   Psychiatric:         Mood and Affect: Mood normal.         Behavior: Behavior normal.         Thought Content: Thought content normal.         Judgment: Judgment normal.         CRANIAL NERVES     CN III, IV, VI   Pupils are equal, round, and reactive to light.     Significant Labs: All pertinent labs within the past 24 hours have been reviewed.  CBC:   Recent Labs   Lab 05/02/23  0749 05/03/23  0436   WBC 6.16 6.44   HGB 8.2* 7.7*   HCT 26.9* 25.7*    259       CMP:   Recent Labs   Lab 05/02/23  0750 05/03/23  0436    138   K 4.9 4.1   CO2 15* 17*   BUN 31.0* 24.0   CREATININE 1.61* 1.37*   CALCIUM 8.4* 8.2*   ALBUMIN 2.2* 2.1*   BILITOT 0.2 0.2   ALKPHOS 56 56   AST 32 22   ALT 20 18         Significant Imaging: I have reviewed all pertinent imaging results/findings within the past 24 hours.      Assessment/Plan:      * Dehydration  Resolving  Will d/c ns    Infiltrate noted on imaging study    Continue levaquin    Agitation    I tim shelton underlying  dementia compliacated by new surroundings and illness  Continue zyprexa at night  Is improved    Chronic gastric ulcer with hemorrhage  As above  carafate and iv ppi      Clostridium difficile infection  continue metronidazole  Lift isolation    Duodenal ulcer    continue protonoix drip  carafate tid  Tolerating regular diet    Acute blood loss anemia    Improved after transfusion initially, has decreased hemoglobin to 7.7  Follow h/h continue iron  Transfuse 2 units now with 40 mg lasix between each  Dr zeyad will scope again today  Npo now    Essential hypertension  Continue home meds and hydralazine prn, clonidine patch    Weakness    I v fluids.   likely due to dehydration and anemia  Continue pt  Will seek snf placement    Diarrhea of presumed infectious origin    Metronidazole for c-diff  Resolved will remove isolation      VTE Risk Mitigation (From admission, onward)         Ordered     IP VTE LOW RISK PATIENT  Once         04/26/23 1538     Place sequential compression device  Until discontinued         04/26/23 1538                Discharge Planning   KB:      Code Status: Full Code   Is the patient medically ready for discharge?:     Reason for patient still in hospital (select all that apply): Patient trending condition  Discharge Plan A: Skilled Nursing Facility   Discharge Delays: None known at this time              Juan Quispe MD  Department of Hospital Medicine   [facility]

## 2023-05-03 NOTE — PT/OT/SLP PROGRESS
Physical Therapy Treatment    Patient Name:  Claus Mcguire   MRN:  [id number]    Recommendations:     Discharge Recommendations:  skilled nursing facility  Discharge Equipment Recommendations:    Barriers to discharge:  medical status    Assessment:     Claus Mcguire is a 82 y.o. male admitted with a medical diagnosis of Dehydration.  He presents with the following impairments/functional limitations: weakness, impaired endurance, impaired self care skills, impaired functional mobility, gait instability.    Pt participated well today, less confused and did well initiating tasks.     Rehab Prognosis: Good; patient would benefit from acute skilled PT services to address these deficits and reach maximum level of function.    Recent Surgery: Procedure(s) (LRB):  EGD (ESOPHAGOGASTRODUODENOSCOPY) (N/A) 6 Days Post-Op    Plan:     During this hospitalization, patient to be seen daily to address the identified rehab impairments via gait training, therapeutic activities, therapeutic exercises and progress toward the following goals:    Plan of Care Expires:       Subjective     Chief Complaint: weakness  Patient/Family Comments/goals: increase mobility  Pain/Comfort:         Objective:     Communicated with family prior to session.  Patient found HOB elevated with   upon PT entry to room.     General Precautions: Standard,   Orthopedic Precautions:    Braces:    Respiratory Status: Room air     Functional Mobility:  Bed Mobility:     Supine to Sit: contact guard assistance and minimum assistance  Transfers:     Sit to Stand:  minimum assistance with rolling walker  Toilet Transfer: minimum assistance with  rolling walker  using  Step Transfer  Gait: 2 x 20' with min-mod A RW, cues for guidance  Balance: mod A in standing      AM-PAC 6 CLICK MOBILITY          Treatment & Education:  See above    Patient left up in chair with all lines intact, call button in reach, chair alarm on, and family present.    GOALS:    Multidisciplinary Problems       Physical Therapy Goals          Problem: Physical Therapy    Goal Priority Disciplines Outcome Goal Variances Interventions   Physical Therapy Goal     PT, PT/OT Ongoing, Progressing                         Time Tracking:     PT Received On:  05/03/2023  PT Start Time:  1300  PT Stop Time:  1325  PT Total Time (min):   25 min    Billable Minutes: Gait Training 15 and Therapeutic Activity 10     PTA              05/03/2023

## 2023-05-03 NOTE — PLAN OF CARE
Problem: Adult Inpatient Plan of Care  Goal: Plan of Care Review  Outcome: Ongoing, Progressing  Goal: Patient-Specific Goal (Individualized)  Outcome: Ongoing, Progressing  Goal: Absence of Hospital-Acquired Illness or Injury  Outcome: Ongoing, Progressing  Intervention: Identify and Manage Fall Risk  Flowsheets (Taken 5/2/2023 2011)  Safety Promotion/Fall Prevention: assistive device/personal item within reach  Intervention: Prevent Skin Injury  Flowsheets (Taken 5/2/2023 2011)  Body Position: position maintained  Skin Protection: adhesive use limited  Intervention: Prevent and Manage VTE (Venous Thromboembolism) Risk  Flowsheets (Taken 5/2/2023 2011)  Activity Management: Rolling - L1  VTE Prevention/Management: fluids promoted  Intervention: Prevent Infection  Flowsheets (Taken 5/2/2023 2011)  Infection Prevention: cohorting utilized  Goal: Optimal Comfort and Wellbeing  Outcome: Ongoing, Progressing  Intervention: Monitor Pain and Promote Comfort  Flowsheets (Taken 5/2/2023 2011)  Pain Management Interventions: relaxation techniques promoted  Intervention: Provide Person-Centered Care  Flowsheets (Taken 5/2/2023 2011)  Trust Relationship/Rapport: care explained  Goal: Readiness for Transition of Care  Outcome: Ongoing, Progressing     Problem: Infection  Goal: Absence of Infection Signs and Symptoms  Outcome: Ongoing, Progressing  Intervention: Prevent or Manage Infection  Flowsheets (Taken 5/2/2023 2011)  Fever Reduction/Comfort Measures: fluid intake increased  Infection Management: aseptic technique maintained  Isolation Precautions: precautions discontinued     Problem: Skin Injury Risk Increased  Goal: Skin Health and Integrity  Outcome: Ongoing, Progressing  Intervention: Optimize Skin Protection  Flowsheets (Taken 5/2/2023 2011)  Pressure Reduction Techniques: frequent weight shift encouraged  Pressure Reduction Devices: positioning supports utilized  Skin Protection: adhesive use limited  Head of Bed  (HOB) Positioning: HOB at 30-45 degrees  Intervention: Promote and Optimize Oral Intake  Flowsheets (Taken 5/2/2023 2011)  Oral Nutrition Promotion: calorie-dense foods provided     Problem: Fluid Volume Deficit  Goal: Fluid Balance  Outcome: Ongoing, Progressing  Intervention: Monitor and Manage Hypovolemia  Flowsheets (Taken 5/2/2023 2011)  Fluid/Electrolyte Management: fluids provided     Problem: Fatigue  Goal: Improved Activity Tolerance  Outcome: Ongoing, Progressing  Intervention: Promote Improved Energy  Flowsheets (Taken 5/2/2023 2011)  Sleep/Rest Enhancement: awakenings minimized  Activity Management: Rolling - L1     Problem: Anemia  Goal: Anemia Symptom Improvement  Outcome: Ongoing, Progressing  Intervention: Monitor and Manage Anemia  Flowsheets (Taken 5/2/2023 2011)  Oral Nutrition Promotion: calorie-dense foods provided  Safety Promotion/Fall Prevention: assistive device/personal item within reach     Problem: Balance Impairment (Functional Deficit)  Goal: Improved Balance and Postural Control  Outcome: Ongoing, Progressing  Intervention: Optimize Balance and Safe Activity  Flowsheets (Taken 5/2/2023 2011)  Safety Promotion/Fall Prevention: assistive device/personal item within reach  Activity Management: Rolling - L1     Problem: Muscle Strength Impairment (Functional Deficit)  Goal: Improved Muscle Strength  Outcome: Ongoing, Progressing

## 2023-05-03 NOTE — ASSESSMENT & PLAN NOTE
I tim shelton underlying dementia compliacated by new surroundings and illness  Continue zyprexa at night  Is improved

## 2023-05-04 LAB
ANION GAP SERPL CALC-SCNC: 10 MEQ/L
APPEARANCE UR: CLEAR
BASOPHILS # BLD AUTO: 0.02 X10(3)/MCL
BASOPHILS NFR BLD AUTO: 0.3 %
BILIRUB UR QL STRIP.AUTO: NEGATIVE MG/DL
BUN SERPL-MCNC: 24 MG/DL (ref 8.4–25.7)
CALCIUM SERPL-MCNC: 8.5 MG/DL (ref 8.8–10)
CHLORIDE SERPL-SCNC: 114 MMOL/L (ref 98–107)
CO2 SERPL-SCNC: 16 MMOL/L (ref 23–31)
COLOR UR AUTO: YELLOW
CREAT SERPL-MCNC: 1.5 MG/DL (ref 0.73–1.18)
CREAT/UREA NIT SERPL: 16
EOSINOPHIL # BLD AUTO: 0.08 X10(3)/MCL (ref 0–0.9)
EOSINOPHIL NFR BLD AUTO: 1.1 %
ERYTHROCYTE [DISTWIDTH] IN BLOOD BY AUTOMATED COUNT: 15.1 % (ref 11.5–17)
GFR SERPLBLD CREATININE-BSD FMLA CKD-EPI: 46 MLS/MIN/1.73/M2
GLUCOSE SERPL-MCNC: 114 MG/DL (ref 82–115)
GLUCOSE UR QL STRIP.AUTO: NEGATIVE MG/DL
HCT VFR BLD AUTO: 32.6 % (ref 42–52)
HGB BLD-MCNC: 10.2 G/DL (ref 14–18)
IMM GRANULOCYTES # BLD AUTO: 0.06 X10(3)/MCL (ref 0–0.04)
IMM GRANULOCYTES NFR BLD AUTO: 0.8 %
KETONES UR QL STRIP.AUTO: NEGATIVE MG/DL
LEUKOCYTE ESTERASE UR QL STRIP.AUTO: NEGATIVE UNIT/L
LYMPHOCYTES # BLD AUTO: 0.76 X10(3)/MCL (ref 0.6–4.6)
LYMPHOCYTES NFR BLD AUTO: 10.6 %
MCH RBC QN AUTO: 27.5 PG (ref 27–31)
MCHC RBC AUTO-ENTMCNC: 31.3 G/DL (ref 33–36)
MCV RBC AUTO: 87.9 FL (ref 80–94)
MONOCYTES # BLD AUTO: 1.01 X10(3)/MCL (ref 0.1–1.3)
MONOCYTES NFR BLD AUTO: 14.1 %
NEUTROPHILS # BLD AUTO: 5.22 X10(3)/MCL (ref 2.1–9.2)
NEUTROPHILS NFR BLD AUTO: 73.1 %
NITRITE UR QL STRIP.AUTO: NEGATIVE
PH UR STRIP.AUTO: 5 [PH]
PLATELET # BLD AUTO: 289 X10(3)/MCL (ref 130–400)
PMV BLD AUTO: 10 FL (ref 7.4–10.4)
POTASSIUM SERPL-SCNC: 4 MMOL/L (ref 3.5–5.1)
PROT UR QL STRIP.AUTO: NEGATIVE MG/DL
RBC # BLD AUTO: 3.71 X10(6)/MCL (ref 4.7–6.1)
RBC UR QL AUTO: NEGATIVE UNIT/L
SODIUM SERPL-SCNC: 140 MMOL/L (ref 136–145)
SP GR UR STRIP.AUTO: >=1.03
UROBILINOGEN UR STRIP-ACNC: 0.2 MG/DL
WBC # SPEC AUTO: 7.15 X10(3)/MCL (ref 4.5–11.5)

## 2023-05-04 PROCEDURE — 81003 URINALYSIS AUTO W/O SCOPE: CPT | Performed by: FAMILY MEDICINE

## 2023-05-04 PROCEDURE — 63600175 PHARM REV CODE 636 W HCPCS: Performed by: FAMILY MEDICINE

## 2023-05-04 PROCEDURE — 85025 COMPLETE CBC W/AUTO DIFF WBC: CPT | Performed by: FAMILY MEDICINE

## 2023-05-04 PROCEDURE — 11000001 HC ACUTE MED/SURG PRIVATE ROOM

## 2023-05-04 PROCEDURE — 25000242 PHARM REV CODE 250 ALT 637 W/ HCPCS: Performed by: FAMILY MEDICINE

## 2023-05-04 PROCEDURE — 25000003 PHARM REV CODE 250: Performed by: FAMILY MEDICINE

## 2023-05-04 PROCEDURE — A4216 STERILE WATER/SALINE, 10 ML: HCPCS | Performed by: FAMILY MEDICINE

## 2023-05-04 PROCEDURE — 87040 BLOOD CULTURE FOR BACTERIA: CPT | Performed by: FAMILY MEDICINE

## 2023-05-04 PROCEDURE — 94799 UNLISTED PULMONARY SVC/PX: CPT

## 2023-05-04 PROCEDURE — 94640 AIRWAY INHALATION TREATMENT: CPT

## 2023-05-04 PROCEDURE — 63600175 PHARM REV CODE 636 W HCPCS: Performed by: SURGERY

## 2023-05-04 PROCEDURE — 99900035 HC TECH TIME PER 15 MIN (STAT)

## 2023-05-04 PROCEDURE — 25000003 PHARM REV CODE 250: Performed by: SURGERY

## 2023-05-04 PROCEDURE — 94761 N-INVAS EAR/PLS OXIMETRY MLT: CPT

## 2023-05-04 PROCEDURE — C9113 INJ PANTOPRAZOLE SODIUM, VIA: HCPCS | Performed by: SURGERY

## 2023-05-04 PROCEDURE — S0030 INJECTION, METRONIDAZOLE: HCPCS | Performed by: FAMILY MEDICINE

## 2023-05-04 PROCEDURE — 80048 BASIC METABOLIC PNL TOTAL CA: CPT | Performed by: FAMILY MEDICINE

## 2023-05-04 RX ORDER — OLMESARTAN MEDOXOMIL 40 MG/1
40 TABLET ORAL NIGHTLY
Status: DISCONTINUED | OUTPATIENT
Start: 2023-05-04 | End: 2023-05-08 | Stop reason: HOSPADM

## 2023-05-04 RX ORDER — ALPRAZOLAM 0.25 MG/1
0.25 TABLET ORAL ONCE
Status: COMPLETED | OUTPATIENT
Start: 2023-05-04 | End: 2023-05-04

## 2023-05-04 RX ORDER — HALOPERIDOL 5 MG/ML
5 INJECTION INTRAMUSCULAR ONCE
Status: COMPLETED | OUTPATIENT
Start: 2023-05-04 | End: 2023-05-04

## 2023-05-04 RX ORDER — LORAZEPAM 2 MG/ML
1 INJECTION INTRAMUSCULAR EVERY 6 HOURS PRN
Status: DISCONTINUED | OUTPATIENT
Start: 2023-05-04 | End: 2023-05-08 | Stop reason: HOSPADM

## 2023-05-04 RX ORDER — METRONIDAZOLE 250 MG/1
500 TABLET ORAL EVERY 12 HOURS
Status: DISCONTINUED | OUTPATIENT
Start: 2023-05-04 | End: 2023-05-08 | Stop reason: HOSPADM

## 2023-05-04 RX ORDER — NEBIVOLOL 5 MG/1
10 TABLET ORAL DAILY
Status: DISCONTINUED | OUTPATIENT
Start: 2023-05-04 | End: 2023-05-07

## 2023-05-04 RX ORDER — HALOPERIDOL 5 MG/ML
5 INJECTION INTRAMUSCULAR EVERY 6 HOURS PRN
Status: DISCONTINUED | OUTPATIENT
Start: 2023-05-04 | End: 2023-05-04

## 2023-05-04 RX ORDER — OLANZAPINE 5 MG/1
20 TABLET ORAL NIGHTLY
Status: DISCONTINUED | OUTPATIENT
Start: 2023-05-04 | End: 2023-05-08 | Stop reason: HOSPADM

## 2023-05-04 RX ADMIN — MICONAZOLE NITRATE: 20 CREAM TOPICAL at 08:05

## 2023-05-04 RX ADMIN — IPRATROPIUM BROMIDE AND ALBUTEROL SULFATE 3 ML: .5; 3 SOLUTION RESPIRATORY (INHALATION) at 07:05

## 2023-05-04 RX ADMIN — METRONIDAZOLE 500 MG: 5 INJECTION, SOLUTION INTRAVENOUS at 01:05

## 2023-05-04 RX ADMIN — PANTOPRAZOLE SODIUM 8 MG/HR: 40 INJECTION, POWDER, FOR SOLUTION INTRAVENOUS at 01:05

## 2023-05-04 RX ADMIN — METRONIDAZOLE 500 MG: 250 TABLET ORAL at 08:05

## 2023-05-04 RX ADMIN — SODIUM CHLORIDE, PRESERVATIVE FREE 10 ML: 5 INJECTION INTRAVENOUS at 01:05

## 2023-05-04 RX ADMIN — SUCRALFATE ORAL 1 G: 1 SUSPENSION ORAL at 08:05

## 2023-05-04 RX ADMIN — PANTOPRAZOLE SODIUM 8 MG/HR: 40 INJECTION, POWDER, FOR SOLUTION INTRAVENOUS at 08:05

## 2023-05-04 RX ADMIN — SODIUM CHLORIDE: 9 INJECTION, SOLUTION INTRAVENOUS at 01:05

## 2023-05-04 RX ADMIN — MICONAZOLE NITRATE: 20 CREAM TOPICAL at 10:05

## 2023-05-04 RX ADMIN — METRONIDAZOLE 500 MG: 250 TABLET ORAL at 10:05

## 2023-05-04 RX ADMIN — AMLODIPINE BESYLATE 10 MG: 10 TABLET ORAL at 10:05

## 2023-05-04 RX ADMIN — TAMSULOSIN HYDROCHLORIDE 0.4 MG: 0.4 CAPSULE ORAL at 10:05

## 2023-05-04 RX ADMIN — OLANZAPINE 20 MG: 5 TABLET, FILM COATED ORAL at 10:05

## 2023-05-04 RX ADMIN — SODIUM CHLORIDE, PRESERVATIVE FREE 10 ML: 5 INJECTION INTRAVENOUS at 11:05

## 2023-05-04 RX ADMIN — HYDRALAZINE HYDROCHLORIDE 25 MG: 25 TABLET, FILM COATED ORAL at 08:05

## 2023-05-04 RX ADMIN — LEVOFLOXACIN 500 MG: 500 INJECTION, SOLUTION INTRAVENOUS at 08:05

## 2023-05-04 RX ADMIN — SODIUM CHLORIDE, PRESERVATIVE FREE 10 ML: 5 INJECTION INTRAVENOUS at 07:05

## 2023-05-04 RX ADMIN — FERROUS GLUCONATE 324 MG: 324 TABLET ORAL at 08:05

## 2023-05-04 RX ADMIN — PANTOPRAZOLE SODIUM 8 MG/HR: 40 INJECTION, POWDER, FOR SOLUTION INTRAVENOUS at 02:05

## 2023-05-04 RX ADMIN — Medication 250 MG: at 08:05

## 2023-05-04 RX ADMIN — NEBIVOLOL HYDROCHLORIDE 10 MG: 5 TABLET ORAL at 10:05

## 2023-05-04 RX ADMIN — PANTOPRAZOLE SODIUM 8 MG/HR: 40 INJECTION, POWDER, FOR SOLUTION INTRAVENOUS at 11:05

## 2023-05-04 RX ADMIN — SUCRALFATE ORAL 1 G: 1 SUSPENSION ORAL at 10:05

## 2023-05-04 RX ADMIN — SPIRONOLACTONE 25 MG: 25 TABLET ORAL at 08:05

## 2023-05-04 RX ADMIN — OLMESARTAN MEDOXOMIL 40 MG: 40 TABLET, FILM COATED ORAL at 09:05

## 2023-05-04 RX ADMIN — LORAZEPAM 1 MG: 2 INJECTION INTRAMUSCULAR; INTRAVENOUS at 11:05

## 2023-05-04 RX ADMIN — HALOPERIDOL LACTATE 5 MG: 5 INJECTION, SOLUTION INTRAMUSCULAR at 08:05

## 2023-05-04 RX ADMIN — HYDROCHLOROTHIAZIDE 25 MG: 25 TABLET ORAL at 08:05

## 2023-05-04 RX ADMIN — HYDRALAZINE HYDROCHLORIDE 25 MG: 25 TABLET, FILM COATED ORAL at 10:05

## 2023-05-04 RX ADMIN — ALPRAZOLAM 0.25 MG: 0.25 TABLET ORAL at 01:05

## 2023-05-04 NOTE — PLAN OF CARE
I spoke to Susi with [facility] [phone] who states they can accept today. She stated [facility] does not have any beds available. I updated Dr Quispe and he stated poss tomorrow d/t pt is agitated today and needs to eval more.

## 2023-05-04 NOTE — PLAN OF CARE
05/04/23 1130   Medicare Message   Important Message from Medicare regarding Discharge Appeal Rights Given to patient/caregiver;Explained to patient/caregiver     Pt sleeping. IMM given to rafi Hartman.

## 2023-05-04 NOTE — ASSESSMENT & PLAN NOTE
I tim shelton underlying dementia compliacated by new surroundings and illness  Seems to worsen with transfusions  Continue zyprexa at night, inc rease to 20 mg po  Haldol now and septic work up  Will do ct head when calm

## 2023-05-04 NOTE — PT/OT/SLP PROGRESS
Physical Therapy      Patient Name:  Claus Mcguire   MRN:  [id number]    Patient not seen today secondary to Other (Comment) (Pt given meds to sleep due to no sleep last night, therapy withheld per family/caregiver today). Will follow-up tomorrow 5/05/23.

## 2023-05-04 NOTE — SUBJECTIVE & OBJECTIVE
hypertension    No past surgical history    Review of patient's allergies indicates:   Allergen Reactions    Penicillins Swelling       No current facility-administered medications on file prior to encounter.     Current Outpatient Medications on File Prior to Encounter   Medication Sig    amLODIPine (NORVASC) 10 MG tablet Take 10 mg by mouth every evening.    hydrALAZINE (APRESOLINE) 25 MG tablet Take 25 mg by mouth 2 (two) times daily.    meloxicam (MOBIC) 15 MG tablet Take 15 mg by mouth.    nebivoloL (BYSTOLIC) 5 MG Tab Take 5 mg by mouth.    olmesartan (BENICAR) 40 MG tablet Take 40 mg by mouth.    spironolactone-hydrochlorothiazide 25-25mg (ALDACTAZIDE) 25-25 mg Tab Take 1 tablet by mouth.    tamsulosin (FLOMAX) 0.4 mg Cap Take 1 capsule by mouth every evening.     Family History    None       Tobacco Use    Smoking status: Former     Packs/day: 2.00     Years: 1.00     Pack years: 2.00     Types: Cigarettes    Smokeless tobacco: Current     Types: Chew   Substance and Sexual Activity    Alcohol use: Yes     Alcohol/week: 6.0 standard drinks     Types: 6 Cans of beer per week    Drug use: Never    Sexual activity: Not Currently     Review of Systems   Constitutional:  Positive for activity change and fatigue. Negative for appetite change and fever.   HENT:  Positive for congestion and rhinorrhea.    Eyes: Negative.    Respiratory:  Positive for cough.    Cardiovascular: Negative.    Gastrointestinal:  Negative for diarrhea, nausea and vomiting.   Endocrine: Negative.    Genitourinary: Negative.    Musculoskeletal:  Negative for back pain.   Skin:  Negative for rash (to the buttocks).   Allergic/Immunologic: Negative.    Neurological: Negative.    Hematological: Negative.    Psychiatric/Behavioral: Negative.     Objective:     Vital Signs (Most Recent):  Temp: 97.4 °F (36.3 °C) (05/04/23 0338)  Pulse: 60 (05/04/23 0724)  Resp: 20 (05/04/23 0724)  BP: (!) 153/68 (05/04/23 0338)  SpO2: 95 % (05/04/23 0724)    Vital Signs (24h Range):  Temp:  [97.4 °F (36.3 °C)-98.4 °F (36.9 °C)] 97.4 °F (36.3 °C)  Pulse:  [44-67] 60  Resp:  [17-20] 20  SpO2:  [78 %-99 %] 95 %  BP: (120-163)/(47-71) 153/68     Weight: 80 kg (176 lb 5.9 oz)  Body mass index is 27.62 kg/m².    Physical Exam  Constitutional:       Appearance: Normal appearance. He is normal weight. He is not ill-appearing.      Comments: anxious   HENT:      Head: Normocephalic and atraumatic.      Left Ear: Tympanic membrane normal.      Nose: Congestion and rhinorrhea present.      Mouth/Throat:      Mouth: Mucous membranes are moist.      Pharynx: Oropharynx is clear. No oropharyngeal exudate.   Eyes:      Extraocular Movements: Extraocular movements intact.      Conjunctiva/sclera: Conjunctivae normal.      Pupils: Pupils are equal, round, and reactive to light.   Cardiovascular:      Rate and Rhythm: Normal rate and regular rhythm.      Pulses: Normal pulses.      Heart sounds: Normal heart sounds. No murmur heard.    No gallop.   Pulmonary:      Effort: Pulmonary effort is normal.      Breath sounds: Normal breath sounds.   Abdominal:      General: Abdomen is flat. Bowel sounds are normal. There is no distension.      Palpations: Abdomen is soft.   Genitourinary:     Penis: Normal.    Musculoskeletal:         General: Normal range of motion.      Cervical back: Normal range of motion and neck supple.      Right lower leg: No edema.   Skin:     General: Skin is warm and dry.      Comments: Erythematous rash to the buttocks   Neurological:      General: No focal deficit present.      Mental Status: He is alert and oriented to person, place, and time. Mental status is at baseline.      Cranial Nerves: No cranial nerve deficit.      Motor: No weakness.      Gait: Gait normal.   Psychiatric:         Mood and Affect: Mood normal.         Behavior: Behavior normal.         Thought Content: Thought content normal.         Judgment: Judgment normal.         CRANIAL NERVES      CN III, IV, VI   Pupils are equal, round, and reactive to light.     Significant Labs: All pertinent labs within the past 24 hours have been reviewed.  CBC:   Recent Labs   Lab 05/03/23  0436 05/03/23  1615 05/04/23  0445   WBC 6.44 7.75 7.15   HGB 7.7* 9.1* 10.2*   HCT 25.7* 28.6* 32.6*    299 289       CMP:   Recent Labs   Lab 05/02/23  0750 05/03/23  0436 05/04/23  0445    138 140   K 4.9 4.1 4.0   CO2 15* 17* 16*   BUN 31.0* 24.0 24.0   CREATININE 1.61* 1.37* 1.50*   CALCIUM 8.4* 8.2* 8.5*   ALBUMIN 2.2* 2.1*  --    BILITOT 0.2 0.2  --    ALKPHOS 56 56  --    AST 32 22  --    ALT 20 18  --          Significant Imaging: I have reviewed all pertinent imaging results/findings within the past 24 hours.

## 2023-05-04 NOTE — PROGRESS NOTES
"Inpatient Nutrition Evaluation    Admit Date: 4/26/2023   Total duration of encounter: 8 days    Nutrition Recommendation/Prescription     Continue current diet as tolerated.     RD available as needed. Thank you.     Nutrition Assessment     Chart Review    Reason Seen: length of stay    Malnutrition Screening Tool Results   Have you recently lost weight without trying?: No  Have you been eating poorly because of a decreased appetite?: No   MST Score: 0     Diagnosis:  Dehydration.     Relevant Medical History:   HTN.    Nutrition-Related Medications:   Vit C; Ferrous Gluconate; Haloperidol.    Nutrition-Related Labs:  5/4: CO2 16(L); Crea 1.50(H); Cl 114(H); H/H 10.2/32.6(L)    Diet Order: Diet diabetic  Oral Supplement Order: none  Appetite/Oral Intake: good/% of meals  Factors Affecting Nutritional Intake: none identified  Food/Christian/Cultural Preferences: none reported  Food Allergies: none reported       Wound(s):       Comments    5/4: Pt with 100% intake yesterday per EMR recorded intake. No recorded intake yet today. Nsg reports pt has been sleeping 2/2 not sleeping well last night. CM reports pt has been accepted to [facility]. No recent weight loss noted/reported. Will continue to monitor during stay.     Anthropometrics    Height: 5' 7" (170.2 cm) Height Method: Stated  Last Weight: 80 kg (176 lb 5.9 oz) (04/27/23 0815) Weight Method: Bed Scale  BMI (Calculated): 27.6  BMI Classification: overweight (BMI 25-29.9)        Ideal Body Weight (IBW), Male: 148 lb     % Ideal Body Weight, Male (lb): 119.17 %                          Usual Weight Provided By: unable to obtain usual weight    Wt Readings from Last 5 Encounters:   04/27/23 80 kg (176 lb 5.9 oz)     Weight Change(s) Since Admission:  Admit Weight: 80 kg (176 lb 5.9 oz) (04/26/23 1647)      Patient Education    Not applicable.    Monitoring & Evaluation     Dietitian will monitor food and beverage intake, energy intake, weight, " electrolyte/renal panel, glucose/endocrine profile, and gastrointestinal profile.  Nutrition Risk/Follow-Up: low (follow-up in 5-7 days)  Patients assigned 'low nutrition risk' status do not qualify for a full nutritional assessment but will be monitored and re-evaluated in a 5-7 day time period. Please consult if re-evaluation needed sooner.

## 2023-05-04 NOTE — PROGRESS NOTES
[facility] - Medical Surgical Unit  [facility] Medicine  Progress Note    Patient Name: Claus Mcguire  MRN: [id number]  Patient Class: IP- Inpatient   Admission Date: 4/26/2023  Length of Stay: 7 days  Attending Physician: Juan Quispe MD  Primary Care Provider: Juan Quispe MD        Subjective:     Principal Problem:Dehydration        HPI:  The 82 year old known to me from clinic.  Presented with 3 days of weakness some nausea but no vomiting no recent travel no questionable food.  Labs were done today which showed increased BUN and creatinine and decreased sodium and chloride.  Both consistent with dehydration.  Also noted was a decrease in hemoglobin hematocrit 11 hemoglobin down to 8.1 in last 6 months.  Patient is weak at this time.  We are admitting the patient for IV fluid hydration workup of his diarrhea and workup of his acute anemia.      Overview/Hospital Course:  Hemoglobin to over 10 today  Nausea has resolved  No abdominal pain today  No sob, is coughing and c/o upper respiratory congestion with clear sputum   No cp  No diarrhea since admit  Has some increased confusion over night, poor sleep despite zyprexa  He had been doing well  Tolerating po well      hypertension    No past surgical history    Review of patient's allergies indicates:   Allergen Reactions    Penicillins Swelling       No current facility-administered medications on file prior to encounter.     Current Outpatient Medications on File Prior to Encounter   Medication Sig    amLODIPine (NORVASC) 10 MG tablet Take 10 mg by mouth every evening.    hydrALAZINE (APRESOLINE) 25 MG tablet Take 25 mg by mouth 2 (two) times daily.    meloxicam (MOBIC) 15 MG tablet Take 15 mg by mouth.    nebivoloL (BYSTOLIC) 5 MG Tab Take 5 mg by mouth.    olmesartan (BENICAR) 40 MG tablet Take 40 mg by mouth.    spironolactone-hydrochlorothiazide 25-25mg (ALDACTAZIDE) 25-25 mg Tab Take 1 tablet by mouth.    tamsulosin (FLOMAX) 0.4 mg Cap  Take 1 capsule by mouth every evening.     Family History    None       Tobacco Use    Smoking status: Former     Packs/day: 2.00     Years: 1.00     Pack years: 2.00     Types: Cigarettes    Smokeless tobacco: Current     Types: Chew   Substance and Sexual Activity    Alcohol use: Yes     Alcohol/week: 6.0 standard drinks     Types: 6 Cans of beer per week    Drug use: Never    Sexual activity: Not Currently     Review of Systems   Constitutional:  Positive for activity change and fatigue. Negative for appetite change and fever.   HENT:  Positive for congestion and rhinorrhea.    Eyes: Negative.    Respiratory:  Positive for cough.    Cardiovascular: Negative.    Gastrointestinal:  Negative for diarrhea, nausea and vomiting.   Endocrine: Negative.    Genitourinary: Negative.    Musculoskeletal:  Negative for back pain.   Skin:  Negative for rash (to the buttocks).   Allergic/Immunologic: Negative.    Neurological: Negative.    Hematological: Negative.    Psychiatric/Behavioral: Negative.     Objective:     Vital Signs (Most Recent):  Temp: 97.4 °F (36.3 °C) (05/04/23 0338)  Pulse: 60 (05/04/23 0724)  Resp: 20 (05/04/23 0724)  BP: (!) 153/68 (05/04/23 0338)  SpO2: 95 % (05/04/23 0724)   Vital Signs (24h Range):  Temp:  [97.4 °F (36.3 °C)-98.4 °F (36.9 °C)] 97.4 °F (36.3 °C)  Pulse:  [44-67] 60  Resp:  [17-20] 20  SpO2:  [78 %-99 %] 95 %  BP: (120-163)/(47-71) 153/68     Weight: 80 kg (176 lb 5.9 oz)  Body mass index is 27.62 kg/m².    Physical Exam  Constitutional:       Appearance: Normal appearance. He is normal weight. He is not ill-appearing.      Comments: anxious   HENT:      Head: Normocephalic and atraumatic.      Left Ear: Tympanic membrane normal.      Nose: Congestion and rhinorrhea present.      Mouth/Throat:      Mouth: Mucous membranes are moist.      Pharynx: Oropharynx is clear. No oropharyngeal exudate.   Eyes:      Extraocular Movements: Extraocular movements intact.      Conjunctiva/sclera:  Conjunctivae normal.      Pupils: Pupils are equal, round, and reactive to light.   Cardiovascular:      Rate and Rhythm: Normal rate and regular rhythm.      Pulses: Normal pulses.      Heart sounds: Normal heart sounds. No murmur heard.    No gallop.   Pulmonary:      Effort: Pulmonary effort is normal.      Breath sounds: Normal breath sounds.   Abdominal:      General: Abdomen is flat. Bowel sounds are normal. There is no distension.      Palpations: Abdomen is soft.   Genitourinary:     Penis: Normal.    Musculoskeletal:         General: Normal range of motion.      Cervical back: Normal range of motion and neck supple.      Right lower leg: No edema.   Skin:     General: Skin is warm and dry.      Comments: Erythematous rash to the buttocks   Neurological:      General: No focal deficit present.      Mental Status: He is alert and oriented to person, place, and time. Mental status is at baseline.      Cranial Nerves: No cranial nerve deficit.      Motor: No weakness.      Gait: Gait normal.   Psychiatric:         Mood and Affect: Mood normal.         Behavior: Behavior normal.         Thought Content: Thought content normal.         Judgment: Judgment normal.         CRANIAL NERVES     CN III, IV, VI   Pupils are equal, round, and reactive to light.     Significant Labs: All pertinent labs within the past 24 hours have been reviewed.  CBC:   Recent Labs   Lab 05/03/23  0436 05/03/23  1615 05/04/23  0445   WBC 6.44 7.75 7.15   HGB 7.7* 9.1* 10.2*   HCT 25.7* 28.6* 32.6*    299 289       CMP:   Recent Labs   Lab 05/02/23  0750 05/03/23  0436 05/04/23  0445    138 140   K 4.9 4.1 4.0   CO2 15* 17* 16*   BUN 31.0* 24.0 24.0   CREATININE 1.61* 1.37* 1.50*   CALCIUM 8.4* 8.2* 8.5*   ALBUMIN 2.2* 2.1*  --    BILITOT 0.2 0.2  --    ALKPHOS 56 56  --    AST 32 22  --    ALT 20 18  --          Significant Imaging: I have reviewed all pertinent imaging results/findings within the past 24  hours.      Assessment/Plan:      * Dehydration  Resolving  Will d/c ns    Infiltrate noted on imaging study  Check cxr today , sats are good  Continue levaquin    Agitation    I tim pect underlying dementia compliacated by new surroundings and illness  Seems to worsen with transfusions  Continue zyprexa at night, inc rease to 20 mg po  Haldol now and septic work up  Will do ct head when calm    Chronic gastric ulcer with hemorrhage  As above  carafate and iv ppi      Clostridium difficile infection  continue metronidazole  Lift isolation    Duodenal ulcer    continue protonoix drip  carafate tid  Tolerating regular diet    Acute blood loss anemia    Improved after transfusion initially, then decreased hemoglobin to 7.7.  Now hemoglobin over 10, no active bleeding  Follow h/h continue iron  No active bleed on egd per dr. jeffers    Essential hypertension  Continue home meds and hydralazine prn, clonidine patch    Weakness    I v fluids.   likely due to dehydration and anemia  Continue pt  Will seek snf placement    Diarrhea of presumed infectious origin    Metronidazole for c-diff to po  Resolved will remove isolation      VTE Risk Mitigation (From admission, onward)           Ordered     IP VTE LOW RISK PATIENT  Once         04/26/23 1538     Place sequential compression device  Until discontinued         04/26/23 1538                    Discharge Planning   KB:      Code Status: Full Code   Is the patient medically ready for discharge?:     Reason for patient still in hospital (select all that apply): Patient trending condition  Discharge Plan A: Skilled Nursing Facility   Discharge Delays: None known at this time  Patient tested positive for covid, likely present on admit but undiagnosed due to gi  symptoms and gi bleed.  Will not have an isolation bed unrtil Monday  betsey Quispe MD  Department of Hospital Medicine   [facility] - Medical Surgical Unit

## 2023-05-04 NOTE — PLAN OF CARE
Problem: Adult Inpatient Plan of Care  Goal: Plan of Care Review  5/3/2023 1955 by Duncan Luu LPN  Outcome: Ongoing, Progressing  5/3/2023 1954 by Duncan Luu LPN  Outcome: Ongoing, Progressing  Goal: Patient-Specific Goal (Individualized)  5/3/2023 1955 by Duncan Luu LPN  Outcome: Ongoing, Progressing  5/3/2023 1954 by Duncan Luu LPN  Outcome: Ongoing, Progressing  Goal: Absence of Hospital-Acquired Illness or Injury  5/3/2023 1955 by Duncan Luu LPN  Outcome: Ongoing, Progressing  5/3/2023 1954 by Duncan Luu LPN  Outcome: Ongoing, Progressing  Goal: Optimal Comfort and Wellbeing  5/3/2023 1955 by Duncan Luu LPN  Outcome: Ongoing, Progressing  5/3/2023 1954 by Duncan Luu LPN  Outcome: Ongoing, Progressing  Goal: Readiness for Transition of Care  5/3/2023 1955 by Duncan Luu LPN  Outcome: Ongoing, Progressing  5/3/2023 1954 by Duncan Luu LPN  Outcome: Ongoing, Progressing     Problem: Infection  Goal: Absence of Infection Signs and Symptoms  5/3/2023 1955 by Duncan Luu LPN  Outcome: Ongoing, Progressing  5/3/2023 1954 by Duncan Luu LPN  Outcome: Ongoing, Progressing     Problem: Skin Injury Risk Increased  Goal: Skin Health and Integrity  5/3/2023 1955 by Duncan Luu LPN  Outcome: Ongoing, Progressing  5/3/2023 1954 by Duncan Luu LPN  Outcome: Ongoing, Progressing     Problem: Fluid Volume Deficit  Goal: Fluid Balance  5/3/2023 1955 by Duncan Luu LPN  Outcome: Ongoing, Progressing  5/3/2023 1954 by Duncan Luu LPN  Outcome: Ongoing, Progressing     Problem: Fatigue  Goal: Improved Activity Tolerance  5/3/2023 1955 by Duncan Luu LPN  Outcome: Ongoing, Progressing  5/3/2023 1954 by Duncan Luu LPN  Outcome: Ongoing, Progressing     Problem: Anemia  Goal: Anemia Symptom Improvement  5/3/2023 1955 by Duncan Luu LPN  Outcome: Ongoing, Progressing  5/3/2023 1954 by Duncan Luu LPN  Outcome: Ongoing,  Progressing     Problem: Balance Impairment (Functional Deficit)  Goal: Improved Balance and Postural Control  5/3/2023 1955 by Duncan Luu LPN  Outcome: Ongoing, Progressing  5/3/2023 1954 by Duncan Luu LPN  Outcome: Ongoing, Progressing     Problem: Muscle Strength Impairment (Functional Deficit)  Goal: Improved Muscle Strength  5/3/2023 1955 by Duncan Luu LPN  Outcome: Ongoing, Progressing  5/3/2023 1954 by Duncan Luu LPN  Outcome: Ongoing, Progressing

## 2023-05-04 NOTE — ASSESSMENT & PLAN NOTE
Improved after transfusion initially, then decreased hemoglobin to 7.7.  Now hemoglobin over 10, no active bleeding  Follow h/h continue iron  No active bleed on egd per dr. jeffers

## 2023-05-05 LAB
PSYCHE PATHOLOGY RESULT: NORMAL
SARS-COV-2 RDRP RESP QL NAA+PROBE: POSITIVE

## 2023-05-05 PROCEDURE — 94799 UNLISTED PULMONARY SVC/PX: CPT

## 2023-05-05 PROCEDURE — 99900035 HC TECH TIME PER 15 MIN (STAT)

## 2023-05-05 PROCEDURE — 11000001 HC ACUTE MED/SURG PRIVATE ROOM

## 2023-05-05 PROCEDURE — 25000242 PHARM REV CODE 250 ALT 637 W/ HCPCS: Performed by: FAMILY MEDICINE

## 2023-05-05 PROCEDURE — 25000003 PHARM REV CODE 250: Performed by: FAMILY MEDICINE

## 2023-05-05 PROCEDURE — 63600175 PHARM REV CODE 636 W HCPCS: Performed by: FAMILY MEDICINE

## 2023-05-05 PROCEDURE — 25000003 PHARM REV CODE 250: Performed by: SURGERY

## 2023-05-05 PROCEDURE — 97530 THERAPEUTIC ACTIVITIES: CPT

## 2023-05-05 PROCEDURE — 94640 AIRWAY INHALATION TREATMENT: CPT

## 2023-05-05 PROCEDURE — 97116 GAIT TRAINING THERAPY: CPT

## 2023-05-05 PROCEDURE — A4216 STERILE WATER/SALINE, 10 ML: HCPCS | Performed by: FAMILY MEDICINE

## 2023-05-05 PROCEDURE — C9113 INJ PANTOPRAZOLE SODIUM, VIA: HCPCS | Performed by: SURGERY

## 2023-05-05 PROCEDURE — 94761 N-INVAS EAR/PLS OXIMETRY MLT: CPT

## 2023-05-05 PROCEDURE — 27000221 HC OXYGEN, UP TO 24 HOURS

## 2023-05-05 PROCEDURE — 87635 SARS-COV-2 COVID-19 AMP PRB: CPT | Performed by: FAMILY MEDICINE

## 2023-05-05 PROCEDURE — 63600175 PHARM REV CODE 636 W HCPCS: Performed by: SURGERY

## 2023-05-05 PROCEDURE — 27000207 HC ISOLATION

## 2023-05-05 RX ORDER — OLANZAPINE 20 MG/1
20 TABLET ORAL NIGHTLY
Qty: 30 TABLET | Refills: 11 | Status: SHIPPED | OUTPATIENT
Start: 2023-05-05 | End: 2024-05-04

## 2023-05-05 RX ORDER — SUCRALFATE 1 G/10ML
1 SUSPENSION ORAL 3 TIMES DAILY
Qty: 900 ML | Refills: 0 | Status: SHIPPED | OUTPATIENT
Start: 2023-05-05

## 2023-05-05 RX ORDER — NEBIVOLOL 10 MG/1
10 TABLET ORAL DAILY
Qty: 30 TABLET | Refills: 11 | Status: SHIPPED | OUTPATIENT
Start: 2023-05-05 | End: 2023-05-08 | Stop reason: HOSPADM

## 2023-05-05 RX ORDER — BUDESONIDE 0.5 MG/2ML
0.5 INHALANT ORAL EVERY 12 HOURS
Status: DISCONTINUED | OUTPATIENT
Start: 2023-05-05 | End: 2023-05-08 | Stop reason: HOSPADM

## 2023-05-05 RX ORDER — DOXYLAMINE SUCCINATE 25 MG
TABLET ORAL 2 TIMES DAILY
Qty: 60 G | Refills: 0 | Status: SHIPPED | OUTPATIENT
Start: 2023-05-05

## 2023-05-05 RX ORDER — ASCORBIC ACID 250 MG
250 TABLET ORAL DAILY
Qty: 30 TABLET | Refills: 4 | Status: SHIPPED | OUTPATIENT
Start: 2023-05-05

## 2023-05-05 RX ORDER — PANTOPRAZOLE SODIUM 40 MG/1
40 TABLET, DELAYED RELEASE ORAL DAILY
Status: DISCONTINUED | OUTPATIENT
Start: 2023-05-05 | End: 2023-05-08 | Stop reason: HOSPADM

## 2023-05-05 RX ORDER — SIMETHICONE 80 MG
80 TABLET,CHEWABLE ORAL 4 TIMES DAILY PRN
Qty: 180 TABLET | Refills: 0 | Status: SHIPPED | OUTPATIENT
Start: 2023-05-05

## 2023-05-05 RX ORDER — OLMESARTAN MEDOXOMIL 40 MG/1
40 TABLET ORAL NIGHTLY
Qty: 90 TABLET | Refills: 3 | Status: SHIPPED | OUTPATIENT
Start: 2023-05-05 | End: 2024-05-04

## 2023-05-05 RX ORDER — CLONIDINE 0.1 MG/24H
1 PATCH, EXTENDED RELEASE TRANSDERMAL
Qty: 4 PATCH | Refills: 11 | Status: SHIPPED | OUTPATIENT
Start: 2023-05-05 | End: 2024-05-04

## 2023-05-05 RX ORDER — FERROUS GLUCONATE 324(38)MG
324 TABLET ORAL
Qty: 30 TABLET | Refills: 0 | Status: SHIPPED | OUTPATIENT
Start: 2023-05-05

## 2023-05-05 RX ORDER — LEVOFLOXACIN 500 MG/1
500 TABLET, FILM COATED ORAL DAILY
Qty: 7 TABLET | Refills: 0 | Status: SHIPPED | OUTPATIENT
Start: 2023-05-05

## 2023-05-05 RX ORDER — METRONIDAZOLE 500 MG/1
500 TABLET ORAL EVERY 12 HOURS
Qty: 15 TABLET | Refills: 0 | Status: SHIPPED | OUTPATIENT
Start: 2023-05-05

## 2023-05-05 RX ADMIN — BUDESONIDE 0.5 MG: 0.5 INHALANT RESPIRATORY (INHALATION) at 07:05

## 2023-05-05 RX ADMIN — OLANZAPINE 20 MG: 5 TABLET, FILM COATED ORAL at 10:05

## 2023-05-05 RX ADMIN — SODIUM CHLORIDE, PRESERVATIVE FREE 10 ML: 5 INJECTION INTRAVENOUS at 06:05

## 2023-05-05 RX ADMIN — IPRATROPIUM BROMIDE AND ALBUTEROL SULFATE 3 ML: .5; 3 SOLUTION RESPIRATORY (INHALATION) at 07:05

## 2023-05-05 RX ADMIN — SUCRALFATE ORAL 1 G: 1 SUSPENSION ORAL at 03:05

## 2023-05-05 RX ADMIN — PANTOPRAZOLE SODIUM 8 MG/HR: 40 INJECTION, POWDER, FOR SOLUTION INTRAVENOUS at 03:05

## 2023-05-05 RX ADMIN — NEBIVOLOL HYDROCHLORIDE 10 MG: 5 TABLET ORAL at 09:05

## 2023-05-05 RX ADMIN — Medication 250 MG: at 09:05

## 2023-05-05 RX ADMIN — METRONIDAZOLE 500 MG: 250 TABLET ORAL at 10:05

## 2023-05-05 RX ADMIN — IPRATROPIUM BROMIDE AND ALBUTEROL SULFATE 3 ML: .5; 3 SOLUTION RESPIRATORY (INHALATION) at 11:05

## 2023-05-05 RX ADMIN — TAMSULOSIN HYDROCHLORIDE 0.4 MG: 0.4 CAPSULE ORAL at 10:05

## 2023-05-05 RX ADMIN — METRONIDAZOLE 500 MG: 250 TABLET ORAL at 09:05

## 2023-05-05 RX ADMIN — AMLODIPINE BESYLATE 10 MG: 10 TABLET ORAL at 10:05

## 2023-05-05 RX ADMIN — SODIUM CHLORIDE, PRESERVATIVE FREE 10 ML: 5 INJECTION INTRAVENOUS at 03:05

## 2023-05-05 RX ADMIN — SUCRALFATE ORAL 1 G: 1 SUSPENSION ORAL at 10:05

## 2023-05-05 RX ADMIN — CLONIDINE 1 PATCH: 0.1 PATCH TRANSDERMAL at 09:05

## 2023-05-05 RX ADMIN — LEVOFLOXACIN 500 MG: 500 INJECTION, SOLUTION INTRAVENOUS at 09:05

## 2023-05-05 RX ADMIN — HYDROCHLOROTHIAZIDE 25 MG: 25 TABLET ORAL at 09:05

## 2023-05-05 RX ADMIN — SPIRONOLACTONE 25 MG: 25 TABLET ORAL at 09:05

## 2023-05-05 RX ADMIN — HYDRALAZINE HYDROCHLORIDE 25 MG: 25 TABLET, FILM COATED ORAL at 09:05

## 2023-05-05 RX ADMIN — FERROUS GLUCONATE 324 MG: 324 TABLET ORAL at 09:05

## 2023-05-05 RX ADMIN — IPRATROPIUM BROMIDE AND ALBUTEROL SULFATE 3 ML: .5; 3 SOLUTION RESPIRATORY (INHALATION) at 03:05

## 2023-05-05 RX ADMIN — SUCRALFATE ORAL 1 G: 1 SUSPENSION ORAL at 09:05

## 2023-05-05 RX ADMIN — PANTOPRAZOLE SODIUM 40 MG: 40 TABLET, DELAYED RELEASE ORAL at 09:05

## 2023-05-05 RX ADMIN — HYDRALAZINE HYDROCHLORIDE 25 MG: 25 TABLET, FILM COATED ORAL at 10:05

## 2023-05-05 NOTE — PLAN OF CARE
1010 Nurse Ivan reports pt is CoVid +- called Susi liaison with Eric to inform.  Susi asks for updates as there maybe poss for pt to have a room at [facility] for needed isolation.  Updates sent via [facility] as requested.    1030  Susi liaison with [facility] called back and upon review of info [facility] or [facility] will not have an available private room til Monday for Isolation purposes.  Nurse Ivan updated.

## 2023-05-05 NOTE — PT/OT/SLP PROGRESS
Physical Therapy Treatment    Patient Name:  Claus Mcguire   MRN:  [id number]    Recommendations:     Discharge Recommendations:    Discharge Equipment Recommendations:    Barriers to discharge: decreased ability of caregivers to be able to physically handle pt for mobility    Assessment:     Claus Mcguire is a 82 y.o. male admitted with a medical diagnosis of Duodenal ulcer.  He presents with the following impairments/functional limitations: weakness, impaired endurance, impaired self care skills, impaired functional mobility, gait instability and early fatigue with activity.    Rehab Prognosis: Good; patient would benefit from acute skilled PT services to address these deficits and reach maximum level of function.    Recent Surgery: Procedure(s) (LRB):  EGD (ESOPHAGOGASTRODUODENOSCOPY) (N/A)  EGD, WITH CLOSED BIOPSY (N/A) 2 Days Post-Op    Plan:     During this hospitalization, patient to be seen daily to address the identified rehab impairments via gait training, therapeutic activities, therapeutic exercises and progress toward the following goals:    Plan of Care Expires:       Subjective     Chief Complaint: tired today  Patient/Family Comments/goals: to get SNF placement to get stronger before going home  Pain/Comfort:         Objective:     Communicated with pt, nurse and caregiver prior to session.  Patient found HOB elevated with   upon PT entry to room.     General Precautions: Standard, contact  Orthopedic Precautions:    Braces:    Respiratory Status: Room air     Functional Mobility:  Bed Mobility:     Supine to Sit: minimum assistance  Transfers:     Sit to Stand:  minimum assistance with rolling walker  Gait: 45ft Timothy RW      AM-PAC 6 CLICK MOBILITY          Treatment & Education:  HEP BLE ex AROM against gravity    Patient left up in chair with all lines intact, chair alarm on, and caregiver present..    GOALS:   Multidisciplinary Problems       Physical Therapy Goals          Problem: Physical  Therapy    Goal Priority Disciplines Outcome Goal Variances Interventions   Physical Therapy Goal     PT, PT/OT Ongoing, Progressing                         Time Tracking:     PT Received On: 05/05/23  PT Start Time: 1115     PT Stop Time: 1145  PT Total Time (min): 30 min     Billable Minutes: Gait Training 15 and Therapeutic Activity 15    Treatment Type: Treatment  PT/PTA: PT           05/05/2023

## 2023-05-06 PROBLEM — U07.1 COVID-19: Status: ACTIVE | Noted: 2023-05-06

## 2023-05-06 PROCEDURE — 94761 N-INVAS EAR/PLS OXIMETRY MLT: CPT

## 2023-05-06 PROCEDURE — 11000001 HC ACUTE MED/SURG PRIVATE ROOM

## 2023-05-06 PROCEDURE — 27000207 HC ISOLATION

## 2023-05-06 PROCEDURE — 25000003 PHARM REV CODE 250: Performed by: FAMILY MEDICINE

## 2023-05-06 PROCEDURE — 27000221 HC OXYGEN, UP TO 24 HOURS

## 2023-05-06 PROCEDURE — 63600175 PHARM REV CODE 636 W HCPCS: Performed by: FAMILY MEDICINE

## 2023-05-06 PROCEDURE — 25000003 PHARM REV CODE 250: Performed by: SURGERY

## 2023-05-06 PROCEDURE — 25000242 PHARM REV CODE 250 ALT 637 W/ HCPCS: Performed by: FAMILY MEDICINE

## 2023-05-06 PROCEDURE — A4216 STERILE WATER/SALINE, 10 ML: HCPCS | Performed by: FAMILY MEDICINE

## 2023-05-06 PROCEDURE — 99900035 HC TECH TIME PER 15 MIN (STAT)

## 2023-05-06 PROCEDURE — 94640 AIRWAY INHALATION TREATMENT: CPT

## 2023-05-06 RX ORDER — DEXTROSE MONOHYDRATE, SODIUM CHLORIDE, AND POTASSIUM CHLORIDE 50; 2.98; 4.5 G/1000ML; G/1000ML; G/1000ML
INJECTION, SOLUTION INTRAVENOUS CONTINUOUS
Status: DISCONTINUED | OUTPATIENT
Start: 2023-05-06 | End: 2023-05-07

## 2023-05-06 RX ORDER — IPRATROPIUM BROMIDE AND ALBUTEROL SULFATE 2.5; .5 MG/3ML; MG/3ML
3 SOLUTION RESPIRATORY (INHALATION) EVERY 4 HOURS
Status: DISCONTINUED | OUTPATIENT
Start: 2023-05-06 | End: 2023-05-08 | Stop reason: HOSPADM

## 2023-05-06 RX ADMIN — SUCRALFATE ORAL 1 G: 1 SUSPENSION ORAL at 09:05

## 2023-05-06 RX ADMIN — POTASSIUM CHLORIDE, DEXTROSE MONOHYDRATE AND SODIUM CHLORIDE: 300; 5; 450 INJECTION, SOLUTION INTRAVENOUS at 02:05

## 2023-05-06 RX ADMIN — BUDESONIDE 0.5 MG: 0.5 INHALANT RESPIRATORY (INHALATION) at 07:05

## 2023-05-06 RX ADMIN — SUCRALFATE ORAL 1 G: 1 SUSPENSION ORAL at 02:05

## 2023-05-06 RX ADMIN — Medication 250 MG: at 09:05

## 2023-05-06 RX ADMIN — IPRATROPIUM BROMIDE AND ALBUTEROL SULFATE 3 ML: .5; 3 SOLUTION RESPIRATORY (INHALATION) at 07:05

## 2023-05-06 RX ADMIN — PANTOPRAZOLE SODIUM 40 MG: 40 TABLET, DELAYED RELEASE ORAL at 09:05

## 2023-05-06 RX ADMIN — MICONAZOLE NITRATE: 20 CREAM TOPICAL at 10:05

## 2023-05-06 RX ADMIN — MICONAZOLE NITRATE: 20 CREAM TOPICAL at 12:05

## 2023-05-06 RX ADMIN — SODIUM CHLORIDE, PRESERVATIVE FREE 10 ML: 5 INJECTION INTRAVENOUS at 02:05

## 2023-05-06 RX ADMIN — NEBIVOLOL HYDROCHLORIDE 10 MG: 5 TABLET ORAL at 09:05

## 2023-05-06 RX ADMIN — IPRATROPIUM BROMIDE AND ALBUTEROL SULFATE 3 ML: .5; 3 SOLUTION RESPIRATORY (INHALATION) at 04:05

## 2023-05-06 RX ADMIN — AMLODIPINE BESYLATE 10 MG: 10 TABLET ORAL at 10:05

## 2023-05-06 RX ADMIN — FERROUS GLUCONATE 324 MG: 324 TABLET ORAL at 09:05

## 2023-05-06 RX ADMIN — LORAZEPAM 1 MG: 2 INJECTION INTRAMUSCULAR; INTRAVENOUS at 04:05

## 2023-05-06 RX ADMIN — HYDRALAZINE HYDROCHLORIDE 25 MG: 25 TABLET, FILM COATED ORAL at 09:05

## 2023-05-06 RX ADMIN — IPRATROPIUM BROMIDE AND ALBUTEROL SULFATE 3 ML: .5; 3 SOLUTION RESPIRATORY (INHALATION) at 11:05

## 2023-05-06 RX ADMIN — METRONIDAZOLE 500 MG: 250 TABLET ORAL at 10:05

## 2023-05-06 RX ADMIN — SPIRONOLACTONE 25 MG: 25 TABLET ORAL at 09:05

## 2023-05-06 RX ADMIN — HYDRALAZINE HYDROCHLORIDE 25 MG: 25 TABLET, FILM COATED ORAL at 10:05

## 2023-05-06 RX ADMIN — LEVOFLOXACIN 500 MG: 500 INJECTION, SOLUTION INTRAVENOUS at 09:05

## 2023-05-06 RX ADMIN — SUCRALFATE ORAL 1 G: 1 SUSPENSION ORAL at 10:05

## 2023-05-06 RX ADMIN — SODIUM CHLORIDE, PRESERVATIVE FREE 10 ML: 5 INJECTION INTRAVENOUS at 11:05

## 2023-05-06 RX ADMIN — IPRATROPIUM BROMIDE AND ALBUTEROL SULFATE 3 ML: .5; 3 SOLUTION RESPIRATORY (INHALATION) at 03:05

## 2023-05-06 RX ADMIN — TAMSULOSIN HYDROCHLORIDE 0.4 MG: 0.4 CAPSULE ORAL at 10:05

## 2023-05-06 RX ADMIN — METRONIDAZOLE 500 MG: 250 TABLET ORAL at 09:05

## 2023-05-06 RX ADMIN — HYDROCHLOROTHIAZIDE 25 MG: 25 TABLET ORAL at 09:05

## 2023-05-06 NOTE — ASSESSMENT & PLAN NOTE
Improved after transfusion initially,  Now hemoglobin over 10, no active bleeding  Follow h/h continue iron  No active bleed on egd per dr. jeffers

## 2023-05-06 NOTE — ASSESSMENT & PLAN NOTE
I v fluids.   likely due to dehydration and anemia,  covid  Continue pt  Will seek snf placement,  Bed available monday

## 2023-05-06 NOTE — SUBJECTIVE & OBJECTIVE
hypertension    No past surgical history    Review of patient's allergies indicates:   Allergen Reactions    Penicillins Swelling       No current facility-administered medications on file prior to encounter.     Current Outpatient Medications on File Prior to Encounter   Medication Sig    amLODIPine (NORVASC) 10 MG tablet Take 10 mg by mouth every evening.    hydrALAZINE (APRESOLINE) 25 MG tablet Take 25 mg by mouth 2 (two) times daily.    nebivoloL (BYSTOLIC) 5 MG Tab Take 5 mg by mouth.    olmesartan (BENICAR) 40 MG tablet Take 40 mg by mouth.    spironolactone-hydrochlorothiazide 25-25mg (ALDACTAZIDE) 25-25 mg Tab Take 1 tablet by mouth.    tamsulosin (FLOMAX) 0.4 mg Cap Take 1 capsule by mouth every evening.     Family History    None       Tobacco Use    Smoking status: Former     Packs/day: 2.00     Years: 1.00     Pack years: 2.00     Types: Cigarettes    Smokeless tobacco: Current     Types: Chew   Substance and Sexual Activity    Alcohol use: Yes     Alcohol/week: 6.0 standard drinks     Types: 6 Cans of beer per week    Drug use: Never    Sexual activity: Not Currently     Review of Systems   Constitutional:  Positive for activity change and fatigue. Negative for appetite change and fever.   HENT:  Positive for congestion and rhinorrhea.    Eyes: Negative.    Respiratory:  Positive for cough.    Cardiovascular: Negative.    Gastrointestinal:  Negative for diarrhea, nausea and vomiting.   Endocrine: Negative.    Genitourinary: Negative.    Musculoskeletal:  Negative for back pain.   Skin:  Negative for rash (to the buttocks).   Allergic/Immunologic: Negative.    Neurological: Negative.    Hematological: Negative.    Psychiatric/Behavioral: Negative.     Objective:     Vital Signs (Most Recent):  Temp: (!) 101.4 °F (38.6 °C) (05/06/23 0744)  Pulse: 75 (05/06/23 1103)  Resp: 20 (05/06/23 1103)  BP: (!) 129/58 (05/06/23 0744)  SpO2: 95 % (05/06/23 1103)   Vital Signs (24h Range):  Temp:  [98.9 °F (37.2  °C)-101.4 °F (38.6 °C)] 101.4 °F (38.6 °C)  Pulse:  [] 75  Resp:  [18-28] 20  SpO2:  [90 %-95 %] 95 %  BP: (127-163)/(49-73) 129/58     Weight: 80 kg (176 lb 5.9 oz)  Body mass index is 27.62 kg/m².    Physical Exam  Constitutional:       Appearance: Normal appearance. He is normal weight. He is not ill-appearing.      Comments: anxious   HENT:      Head: Normocephalic and atraumatic.      Left Ear: Tympanic membrane normal.      Nose: Congestion and rhinorrhea present.      Mouth/Throat:      Mouth: Mucous membranes are moist.      Pharynx: Oropharynx is clear. No oropharyngeal exudate.   Eyes:      Extraocular Movements: Extraocular movements intact.      Conjunctiva/sclera: Conjunctivae normal.      Pupils: Pupils are equal, round, and reactive to light.   Cardiovascular:      Rate and Rhythm: Normal rate and regular rhythm.      Pulses: Normal pulses.      Heart sounds: Normal heart sounds. No murmur heard.    No gallop.   Pulmonary:      Effort: Pulmonary effort is normal.      Breath sounds: Normal breath sounds.   Abdominal:      General: Abdomen is flat. Bowel sounds are normal. There is no distension.      Palpations: Abdomen is soft.   Genitourinary:     Penis: Normal.    Musculoskeletal:         General: Normal range of motion.      Cervical back: Normal range of motion and neck supple.      Right lower leg: No edema.   Skin:     General: Skin is warm and dry.      Comments: Erythematous rash to the buttocks   Neurological:      General: No focal deficit present.      Mental Status: He is alert and oriented to person, place, and time. Mental status is at baseline.      Cranial Nerves: No cranial nerve deficit.      Motor: No weakness.      Gait: Gait normal.   Psychiatric:         Mood and Affect: Mood normal.         Behavior: Behavior normal.         Thought Content: Thought content normal.         Judgment: Judgment normal.         CRANIAL NERVES     CN III, IV, VI   Pupils are equal, round, and  reactive to light.     Significant Labs: All pertinent labs within the past 24 hours have been reviewed.  CBC: No results for input(s): WBC, HGB, HCT, PLT in the last 48 hours.    CMP: No results for input(s): NA, K, CL, CO2, GLU, BUN, CREATININE, CALCIUM, PROT, ALBUMIN, BILITOT, ALKPHOS, AST, ALT, ANIONGAP, EGFRNONAA in the last 48 hours.    Invalid input(s): ESTGF[place]      Significant Imaging: I have reviewed all pertinent imaging results/findings within the past 24 hours.

## 2023-05-06 NOTE — ASSESSMENT & PLAN NOTE
I tim pecsana underlying dementia compliacated by new surroundings and illness  Seems to worsen with transfusions/ anesthesia  Continue zyprexa at night 20 mg   septic work up wnl     Did have fever this am , will check cxr  Will do ct head when calm

## 2023-05-06 NOTE — PT/OT/SLP PROGRESS
Physical Therapy      Patient Name:  Claus Mcguire   MRN:  [id number]    Patient not seen today secondary to Unarousable, Other (Comment) (Nursing/family reports patient was given something to help him rest this morning, unable to arouse enough to participate.). Will follow-up on Monday, 5/8/23.

## 2023-05-06 NOTE — PROGRESS NOTES
[facility] - Medical Surgical Unit  [facility] Medicine  Progress Note    Patient Name: Claus Mcguire  MRN: [id number]  Patient Class: IP- Inpatient   Admission Date: 4/26/2023  Length of Stay: 9 days  Attending Physician: Juan Quispe MD  Primary Care Provider: Juan Quispe MD        Subjective:     Principal Problem:Duodenal ulcer        HPI:  The 82 year old known to me from clinic.  Presented with 3 days of weakness some nausea but no vomiting no recent travel no questionable food.  Labs were done today which showed increased BUN and creatinine and decreased sodium and chloride.  Both consistent with dehydration.  Also noted was a decrease in hemoglobin hematocrit 11 hemoglobin down to 8.1 in last 6 months.  Patient is weak at this time.  We are admitting the patient for IV fluid hydration workup of his diarrhea and workup of his acute anemia.      Overview/Hospital Course:    Nausea has resolved  No abdominal pain today  No sob, is coughing and c/o upper respiratory congestion with clear sputum   No cp  No diarrhea since admit  confusion over night improved, better sleep on zyprexa 20  Tolerating po well  Tested positive for covid 19 so d/c had to be held until Monday when an isolation bed was available      hypertension    No past surgical history    Review of patient's allergies indicates:   Allergen Reactions    Penicillins Swelling       No current facility-administered medications on file prior to encounter.     Current Outpatient Medications on File Prior to Encounter   Medication Sig    amLODIPine (NORVASC) 10 MG tablet Take 10 mg by mouth every evening.    hydrALAZINE (APRESOLINE) 25 MG tablet Take 25 mg by mouth 2 (two) times daily.    nebivoloL (BYSTOLIC) 5 MG Tab Take 5 mg by mouth.    olmesartan (BENICAR) 40 MG tablet Take 40 mg by mouth.    spironolactone-hydrochlorothiazide 25-25mg (ALDACTAZIDE) 25-25 mg Tab Take 1 tablet by mouth.    tamsulosin (FLOMAX) 0.4 mg Cap Take  1 capsule by mouth every evening.     Family History    None       Tobacco Use    Smoking status: Former     Packs/day: 2.00     Years: 1.00     Pack years: 2.00     Types: Cigarettes    Smokeless tobacco: Current     Types: Chew   Substance and Sexual Activity    Alcohol use: Yes     Alcohol/week: 6.0 standard drinks     Types: 6 Cans of beer per week    Drug use: Never    Sexual activity: Not Currently     Review of Systems   Constitutional:  Positive for activity change and fatigue. Negative for appetite change and fever.   HENT:  Positive for congestion and rhinorrhea.    Eyes: Negative.    Respiratory:  Positive for cough.    Cardiovascular: Negative.    Gastrointestinal:  Negative for diarrhea, nausea and vomiting.   Endocrine: Negative.    Genitourinary: Negative.    Musculoskeletal:  Negative for back pain.   Skin:  Negative for rash (to the buttocks).   Allergic/Immunologic: Negative.    Neurological: Negative.    Hematological: Negative.    Psychiatric/Behavioral: Negative.     Objective:     Vital Signs (Most Recent):  Temp: (!) 101.4 °F (38.6 °C) (05/06/23 0744)  Pulse: 75 (05/06/23 1103)  Resp: 20 (05/06/23 1103)  BP: (!) 129/58 (05/06/23 0744)  SpO2: 95 % (05/06/23 1103)   Vital Signs (24h Range):  Temp:  [98.9 °F (37.2 °C)-101.4 °F (38.6 °C)] 101.4 °F (38.6 °C)  Pulse:  [] 75  Resp:  [18-28] 20  SpO2:  [90 %-95 %] 95 %  BP: (127-163)/(49-73) 129/58     Weight: 80 kg (176 lb 5.9 oz)  Body mass index is 27.62 kg/m².    Physical Exam  Constitutional:       Appearance: Normal appearance. He is normal weight. He is not ill-appearing.      Comments: anxious   HENT:      Head: Normocephalic and atraumatic.      Left Ear: Tympanic membrane normal.      Nose: Congestion and rhinorrhea present.      Mouth/Throat:      Mouth: Mucous membranes are moist.      Pharynx: Oropharynx is clear. No oropharyngeal exudate.   Eyes:      Extraocular Movements: Extraocular movements intact.       Conjunctiva/sclera: Conjunctivae normal.      Pupils: Pupils are equal, round, and reactive to light.   Cardiovascular:      Rate and Rhythm: Normal rate and regular rhythm.      Pulses: Normal pulses.      Heart sounds: Normal heart sounds. No murmur heard.    No gallop.   Pulmonary:      Effort: Pulmonary effort is normal.      Breath sounds: Normal breath sounds.   Abdominal:      General: Abdomen is flat. Bowel sounds are normal. There is no distension.      Palpations: Abdomen is soft.   Genitourinary:     Penis: Normal.    Musculoskeletal:         General: Normal range of motion.      Cervical back: Normal range of motion and neck supple.      Right lower leg: No edema.   Skin:     General: Skin is warm and dry.      Comments: Erythematous rash to the buttocks   Neurological:      General: No focal deficit present.      Mental Status: He is alert and oriented to person, place, and time. Mental status is at baseline.      Cranial Nerves: No cranial nerve deficit.      Motor: No weakness.      Gait: Gait normal.   Psychiatric:         Mood and Affect: Mood normal.         Behavior: Behavior normal.         Thought Content: Thought content normal.         Judgment: Judgment normal.         CRANIAL NERVES     CN III, IV, VI   Pupils are equal, round, and reactive to light.     Significant Labs: All pertinent labs within the past 24 hours have been reviewed.  CBC: No results for input(s): WBC, HGB, HCT, PLT in the last 48 hours.    CMP: No results for input(s): NA, K, CL, CO2, GLU, BUN, CREATININE, CALCIUM, PROT, ALBUMIN, BILITOT, ALKPHOS, AST, ALT, ANIONGAP, EGFRNONAA in the last 48 hours.    Invalid input(s): [place]GF[place]      Significant Imaging: I have reviewed all pertinent imaging results/findings within the past 24 hours.      Assessment/Plan:      * Duodenal ulcer    continue protonoix po  carafate tid  Tolerating regular diet    Infiltrate noted on imaging study  Check cxr today , sats are good  Continue  levaquin    Agitation    I tim pect underlying dementia compliacated by new surroundings and illness  Seems to worsen with transfusions/ anesthesia  Continue zyprexa at night 20 mg   septic work up wnl     Did have fever this am , will check cxr  Will do ct head when calm    Chronic gastric ulcer with hemorrhage  As above  carafate and po ppi      Clostridium difficile infection  continue metronidazole  Lift isolation    Acute blood loss anemia    Improved after transfusion initially,  Now hemoglobin over 10, no active bleeding  Follow h/h continue iron  No active bleed on egd per dr. jeffers    Essential hypertension  Continue home meds and hydralazine prn, clonidine patch    Weakness    I v fluids.   likely due to dehydration and anemia,  covid  Continue pt  Will seek snf placement,  Bed available monday    Diarrhea of presumed infectious origin    Metronidazole for c-diff to po  Resolved will remove isolation    Dehydration  Resolving  Will d/c ns,  d5 1/2 ns with 40 kcl      VTE Risk Mitigation (From admission, onward)         Ordered     IP VTE LOW RISK PATIENT  Once         04/26/23 1538     Place sequential compression device  Until discontinued         04/26/23 1538                Discharge Planning   KB: 5/5/2023     Code Status: Full Code   Is the patient medically ready for discharge?:     Reason for patient still in hospital (select all that apply): Patient trending condition  Discharge Plan A: Skilled Nursing Facility   Discharge Delays: None known at this time              Juan Quispe MD  Department of Hospital Medicine   [facility] - Medical Surgical Unit

## 2023-05-07 LAB
ANION GAP SERPL CALC-SCNC: 6 MEQ/L
BASOPHILS # BLD AUTO: 0.01 X10(3)/MCL
BASOPHILS NFR BLD AUTO: 0.1 %
BUN SERPL-MCNC: 34.7 MG/DL (ref 8.4–25.7)
CALCIUM SERPL-MCNC: 8.6 MG/DL (ref 8.8–10)
CHLORIDE SERPL-SCNC: 119 MMOL/L (ref 98–107)
CO2 SERPL-SCNC: 17 MMOL/L (ref 23–31)
CREAT SERPL-MCNC: 1.91 MG/DL (ref 0.73–1.18)
CREAT/UREA NIT SERPL: 18
EOSINOPHIL # BLD AUTO: 0.05 X10(3)/MCL (ref 0–0.9)
EOSINOPHIL NFR BLD AUTO: 0.5 %
ERYTHROCYTE [DISTWIDTH] IN BLOOD BY AUTOMATED COUNT: 16.4 % (ref 11.5–17)
GFR SERPLBLD CREATININE-BSD FMLA CKD-EPI: 35 MLS/MIN/1.73/M2
GLUCOSE SERPL-MCNC: 143 MG/DL (ref 82–115)
HCT VFR BLD AUTO: 28.7 % (ref 42–52)
HGB BLD-MCNC: 8.8 G/DL (ref 14–18)
IMM GRANULOCYTES # BLD AUTO: 0.04 X10(3)/MCL (ref 0–0.04)
IMM GRANULOCYTES NFR BLD AUTO: 0.4 %
LYMPHOCYTES # BLD AUTO: 0.83 X10(3)/MCL (ref 0.6–4.6)
LYMPHOCYTES NFR BLD AUTO: 8.7 %
MCH RBC QN AUTO: 27.8 PG (ref 27–31)
MCHC RBC AUTO-ENTMCNC: 30.7 G/DL (ref 33–36)
MCV RBC AUTO: 90.8 FL (ref 80–94)
MONOCYTES # BLD AUTO: 1.46 X10(3)/MCL (ref 0.1–1.3)
MONOCYTES NFR BLD AUTO: 15.2 %
NEUTROPHILS # BLD AUTO: 7.19 X10(3)/MCL (ref 2.1–9.2)
NEUTROPHILS NFR BLD AUTO: 75.1 %
PLATELET # BLD AUTO: 283 X10(3)/MCL (ref 130–400)
PMV BLD AUTO: 9.4 FL (ref 7.4–10.4)
POTASSIUM SERPL-SCNC: 5.2 MMOL/L (ref 3.5–5.1)
RBC # BLD AUTO: 3.16 X10(6)/MCL (ref 4.7–6.1)
SODIUM SERPL-SCNC: 142 MMOL/L (ref 136–145)
WBC # SPEC AUTO: 9.58 X10(3)/MCL (ref 4.5–11.5)

## 2023-05-07 PROCEDURE — 63600175 PHARM REV CODE 636 W HCPCS: Performed by: FAMILY MEDICINE

## 2023-05-07 PROCEDURE — A4216 STERILE WATER/SALINE, 10 ML: HCPCS | Performed by: FAMILY MEDICINE

## 2023-05-07 PROCEDURE — 80048 BASIC METABOLIC PNL TOTAL CA: CPT | Performed by: FAMILY MEDICINE

## 2023-05-07 PROCEDURE — 25000242 PHARM REV CODE 250 ALT 637 W/ HCPCS: Performed by: FAMILY MEDICINE

## 2023-05-07 PROCEDURE — 94799 UNLISTED PULMONARY SVC/PX: CPT

## 2023-05-07 PROCEDURE — 94640 AIRWAY INHALATION TREATMENT: CPT

## 2023-05-07 PROCEDURE — 94761 N-INVAS EAR/PLS OXIMETRY MLT: CPT

## 2023-05-07 PROCEDURE — 25000003 PHARM REV CODE 250: Performed by: SURGERY

## 2023-05-07 PROCEDURE — 25000003 PHARM REV CODE 250: Performed by: FAMILY MEDICINE

## 2023-05-07 PROCEDURE — 85025 COMPLETE CBC W/AUTO DIFF WBC: CPT | Performed by: FAMILY MEDICINE

## 2023-05-07 PROCEDURE — 27000221 HC OXYGEN, UP TO 24 HOURS

## 2023-05-07 PROCEDURE — 27000207 HC ISOLATION

## 2023-05-07 PROCEDURE — 99900035 HC TECH TIME PER 15 MIN (STAT)

## 2023-05-07 PROCEDURE — S5010 5% DEXTROSE AND 0.45% SALINE: HCPCS | Performed by: FAMILY MEDICINE

## 2023-05-07 PROCEDURE — 11000001 HC ACUTE MED/SURG PRIVATE ROOM

## 2023-05-07 RX ORDER — DEXTROSE MONOHYDRATE AND SODIUM CHLORIDE 5; .45 G/100ML; G/100ML
INJECTION, SOLUTION INTRAVENOUS CONTINUOUS
Status: DISCONTINUED | OUTPATIENT
Start: 2023-05-07 | End: 2023-05-08 | Stop reason: HOSPADM

## 2023-05-07 RX ORDER — NEBIVOLOL 5 MG/1
5 TABLET ORAL DAILY
Status: DISCONTINUED | OUTPATIENT
Start: 2023-05-08 | End: 2023-05-08 | Stop reason: HOSPADM

## 2023-05-07 RX ORDER — LEVOFLOXACIN 5 MG/ML
750 INJECTION, SOLUTION INTRAVENOUS
Status: DISCONTINUED | OUTPATIENT
Start: 2023-05-08 | End: 2023-05-08 | Stop reason: HOSPADM

## 2023-05-07 RX ADMIN — IPRATROPIUM BROMIDE AND ALBUTEROL SULFATE 3 ML: .5; 3 SOLUTION RESPIRATORY (INHALATION) at 03:05

## 2023-05-07 RX ADMIN — IPRATROPIUM BROMIDE AND ALBUTEROL SULFATE 3 ML: .5; 3 SOLUTION RESPIRATORY (INHALATION) at 07:05

## 2023-05-07 RX ADMIN — HYDRALAZINE HYDROCHLORIDE 25 MG: 25 TABLET, FILM COATED ORAL at 10:05

## 2023-05-07 RX ADMIN — SUCRALFATE ORAL 1 G: 1 SUSPENSION ORAL at 09:05

## 2023-05-07 RX ADMIN — FERROUS GLUCONATE 324 MG: 324 TABLET ORAL at 09:05

## 2023-05-07 RX ADMIN — HYDROCHLOROTHIAZIDE 25 MG: 25 TABLET ORAL at 09:05

## 2023-05-07 RX ADMIN — BUDESONIDE 0.5 MG: 0.5 INHALANT RESPIRATORY (INHALATION) at 07:05

## 2023-05-07 RX ADMIN — IPRATROPIUM BROMIDE AND ALBUTEROL SULFATE 3 ML: .5; 3 SOLUTION RESPIRATORY (INHALATION) at 11:05

## 2023-05-07 RX ADMIN — METRONIDAZOLE 500 MG: 250 TABLET ORAL at 10:05

## 2023-05-07 RX ADMIN — AMLODIPINE BESYLATE 10 MG: 10 TABLET ORAL at 10:05

## 2023-05-07 RX ADMIN — TAMSULOSIN HYDROCHLORIDE 0.4 MG: 0.4 CAPSULE ORAL at 10:05

## 2023-05-07 RX ADMIN — LEVOFLOXACIN 500 MG: 500 INJECTION, SOLUTION INTRAVENOUS at 09:05

## 2023-05-07 RX ADMIN — SUCRALFATE ORAL 1 G: 1 SUSPENSION ORAL at 03:05

## 2023-05-07 RX ADMIN — SPIRONOLACTONE 25 MG: 25 TABLET ORAL at 09:05

## 2023-05-07 RX ADMIN — Medication 250 MG: at 09:05

## 2023-05-07 RX ADMIN — DEXTROSE AND SODIUM CHLORIDE: 5; 450 INJECTION, SOLUTION INTRAVENOUS at 03:05

## 2023-05-07 RX ADMIN — METRONIDAZOLE 500 MG: 250 TABLET ORAL at 09:05

## 2023-05-07 RX ADMIN — POTASSIUM CHLORIDE, DEXTROSE MONOHYDRATE AND SODIUM CHLORIDE: 300; 5; 450 INJECTION, SOLUTION INTRAVENOUS at 03:05

## 2023-05-07 RX ADMIN — DEXTROSE AND SODIUM CHLORIDE: 5; 450 INJECTION, SOLUTION INTRAVENOUS at 10:05

## 2023-05-07 RX ADMIN — PANTOPRAZOLE SODIUM 40 MG: 40 TABLET, DELAYED RELEASE ORAL at 09:05

## 2023-05-07 RX ADMIN — HYDRALAZINE HYDROCHLORIDE 25 MG: 25 TABLET, FILM COATED ORAL at 09:05

## 2023-05-07 RX ADMIN — SUCRALFATE ORAL 1 G: 1 SUSPENSION ORAL at 10:05

## 2023-05-07 RX ADMIN — SODIUM CHLORIDE, PRESERVATIVE FREE 10 ML: 5 INJECTION INTRAVENOUS at 06:05

## 2023-05-07 NOTE — PLAN OF CARE
Pt is very drowsy. Pt is able to be awaken by voice. Certain meds were held to decrease the drowsiness. Pt has a mane in place. Pt receiving iv fluids. Precautions are still maintained. Will continue to monitor

## 2023-05-07 NOTE — SUBJECTIVE & OBJECTIVE
hypertension    No past surgical history    Review of patient's allergies indicates:   Allergen Reactions    Penicillins Swelling       No current facility-administered medications on file prior to encounter.     Current Outpatient Medications on File Prior to Encounter   Medication Sig    amLODIPine (NORVASC) 10 MG tablet Take 10 mg by mouth every evening.    hydrALAZINE (APRESOLINE) 25 MG tablet Take 25 mg by mouth 2 (two) times daily.    nebivoloL (BYSTOLIC) 5 MG Tab Take 5 mg by mouth.    olmesartan (BENICAR) 40 MG tablet Take 40 mg by mouth.    spironolactone-hydrochlorothiazide 25-25mg (ALDACTAZIDE) 25-25 mg Tab Take 1 tablet by mouth.    tamsulosin (FLOMAX) 0.4 mg Cap Take 1 capsule by mouth every evening.     Family History    None       Tobacco Use    Smoking status: Former     Packs/day: 2.00     Years: 1.00     Pack years: 2.00     Types: Cigarettes    Smokeless tobacco: Current     Types: Chew   Substance and Sexual Activity    Alcohol use: Yes     Alcohol/week: 6.0 standard drinks     Types: 6 Cans of beer per week    Drug use: Never    Sexual activity: Not Currently     Review of Systems   Constitutional:  Positive for activity change and fatigue. Negative for appetite change and fever.   HENT:  Positive for congestion and rhinorrhea.    Eyes: Negative.    Respiratory:  Positive for cough.    Cardiovascular: Negative.    Gastrointestinal:  Negative for diarrhea, nausea and vomiting.   Endocrine: Negative.    Genitourinary: Negative.    Musculoskeletal:  Negative for back pain.   Skin:  Negative for rash (to the buttocks).   Allergic/Immunologic: Negative.    Neurological: Negative.    Hematological: Negative.    Psychiatric/Behavioral: Negative.     Objective:     Vital Signs (Most Recent):  Temp: 97.7 °F (36.5 °C) (05/07/23 0846)  Pulse: 71 (05/07/23 0846)  Resp: 20 (05/07/23 0846)  BP: 116/66 (05/07/23 0846)  SpO2: (!) 94 % (05/07/23 0724)   Vital Signs (24h Range):  Temp:  [97.7 °F (36.5 °C)-101.3  °F (38.5 °C)] 97.7 °F (36.5 °C)  Pulse:  [] 71  Resp:  [18-20] 20  SpO2:  [93 %-97 %] 94 %  BP: (106-119)/(48-66) 116/66     Weight: 80 kg (176 lb 5.9 oz)  Body mass index is 27.62 kg/m².    Physical Exam  Constitutional:       Appearance: Normal appearance. He is normal weight. He is not ill-appearing.      Comments: anxious   HENT:      Head: Normocephalic and atraumatic.      Left Ear: Tympanic membrane normal.      Nose: Congestion and rhinorrhea present.      Mouth/Throat:      Mouth: Mucous membranes are moist.      Pharynx: Oropharynx is clear. No oropharyngeal exudate.   Eyes:      Extraocular Movements: Extraocular movements intact.      Conjunctiva/sclera: Conjunctivae normal.      Pupils: Pupils are equal, round, and reactive to light.   Cardiovascular:      Rate and Rhythm: Normal rate and regular rhythm.      Pulses: Normal pulses.      Heart sounds: Normal heart sounds. No murmur heard.    No gallop.   Pulmonary:      Effort: Pulmonary effort is normal.      Breath sounds: Normal breath sounds.   Abdominal:      General: Abdomen is flat. Bowel sounds are normal. There is no distension.      Palpations: Abdomen is soft.   Genitourinary:     Penis: Normal.    Musculoskeletal:         General: Normal range of motion.      Cervical back: Normal range of motion and neck supple.      Right lower leg: No edema.   Skin:     General: Skin is warm and dry.      Comments: Erythematous rash to the buttocks   Neurological:      General: No focal deficit present.      Mental Status: He is alert and oriented to person, place, and time. Mental status is at baseline.      Cranial Nerves: No cranial nerve deficit.      Motor: No weakness.      Gait: Gait normal.   Psychiatric:         Mood and Affect: Mood normal.         Behavior: Behavior normal.         Thought Content: Thought content normal.         Judgment: Judgment normal.         CRANIAL NERVES     CN III, IV, VI   Pupils are equal, round, and reactive to  light.     Significant Labs: All pertinent labs within the past 24 hours have been reviewed.  CBC:   Recent Labs   Lab 05/07/23 0313   WBC 9.58   HGB 8.8*   HCT 28.7*          CMP:   Recent Labs   Lab 05/07/23 0313      K 5.2*   CO2 17*   BUN 34.7*   CREATININE 1.91*   CALCIUM 8.6*         Significant Imaging: I have reviewed all pertinent imaging results/findings within the past 24 hours.

## 2023-05-07 NOTE — NURSING
Patient's 0900 medications in pill form were not given on 5/6/2023 due to excessive somnolence. Dr. Quispe notified and agreed. Dr. Quispe also recommended to hold Ativan PRN for anxiety as well as Olanzapine. Unable to edit administration at this time.

## 2023-05-07 NOTE — ASSESSMENT & PLAN NOTE
I tim shelton underlying dementia compliacated by new surroundings and illness  Seems to worsen with transfusions/ anesthesia  Continue zyprexa at night 20 mg   septic work up wnl     cxr wnl  Ct head with vascular dementia changes otherwise wnl  Improved today

## 2023-05-07 NOTE — PROGRESS NOTES
Pharmacist Renal Dose Adjustment Note    Claus Mcguire is a 82 y.o. male being treated with the medication levofloxacin    Patient Data:    Vital Signs (Most Recent):  Temp: 97.7 °F (36.5 °C) (05/07/23 0846)  Pulse: 71 (05/07/23 0846)  Resp: 20 (05/07/23 0846)  BP: 116/66 (05/07/23 0846)  SpO2: (!) 94 % (05/07/23 0724) Vital Signs (72h Range):  Temp:  [97.4 °F (36.3 °C)-101.4 °F (38.6 °C)]   Pulse:  []   Resp:  [18-28]   BP: (106-173)/(48-73)   SpO2:  [90 %-97 %]      Recent Labs   Lab 05/03/23  0436 05/04/23  0445 05/07/23  0313   CREATININE 1.37* 1.50* 1.91*     Serum creatinine: 1.91 mg/dL (H) 05/07/23 0313  Estimated creatinine clearance: 30.8 mL/min (A)    Medication:levofloxacin dose: 500mg frequency q24h will be changed to medication:levofloxacin dose:750mg frequency:q48h    Pharmacist's Name: Colton Amezcua  Pharmacist's Extension: [phone]

## 2023-05-07 NOTE — ASSESSMENT & PLAN NOTE
Improved after transfusion initially,  Now hemoglobin at 8, no active bleeding  Follow h/h continue iron  No active bleed on egd per dr. jeffers

## 2023-05-07 NOTE — PROGRESS NOTES
[facility]  [facility]  Progress Note    Patient Name: Claus Mcguire  MRN: [id number]  Patient Class: IP- Inpatient   Admission Date: 4/26/2023  Length of Stay: 10 days  Attending Physician: Juan Quispe MD  Primary Care Provider: Juan Quispe MD        Subjective:     Principal Problem:Duodenal ulcer        HPI:  The 82 year old known to me from clinic.  Presented with 3 days of weakness some nausea but no vomiting no recent travel no questionable food.  Labs were done today which showed increased BUN and creatinine and decreased sodium and chloride.  Both consistent with dehydration.  Also noted was a decrease in hemoglobin hematocrit 11 hemoglobin down to 8.1 in last 6 months.  Patient is weak at this time.  We are admitting the patient for IV fluid hydration workup of his diarrhea and workup of his acute anemia.      Overview/Hospital Course:    Nausea has resolved  No abdominal pain today  No sob, is coughing and c/o upper respiratory congestion with clear sputum , this is improved  No cp  No diarrhea since admit  confusion over night improved, better sleep,  more alert today and responsive answers approporiately  Tolerating po well  Tested positive for covid 19 so d/c had to be held until Monday when an isolation bed was available  One fever, has trended down since      hypertension    No past surgical history    Review of patient's allergies indicates:   Allergen Reactions    Penicillins Swelling       No current facility-administered medications on file prior to encounter.     Current Outpatient Medications on File Prior to Encounter   Medication Sig    amLODIPine (NORVASC) 10 MG tablet Take 10 mg by mouth every evening.    hydrALAZINE (APRESOLINE) 25 MG tablet Take 25 mg by mouth 2 (two) times daily.    nebivoloL (BYSTOLIC) 5 MG Tab Take 5 mg by mouth.    olmesartan (BENICAR) 40 MG tablet Take 40 mg by mouth.    spironolactone-hydrochlorothiazide  25-25mg (ALDACTAZIDE) 25-25 mg Tab Take 1 tablet by mouth.    tamsulosin (FLOMAX) 0.4 mg Cap Take 1 capsule by mouth every evening.     Family History    None       Tobacco Use    Smoking status: Former     Packs/day: 2.00     Years: 1.00     Pack years: 2.00     Types: Cigarettes    Smokeless tobacco: Current     Types: Chew   Substance and Sexual Activity    Alcohol use: Yes     Alcohol/week: 6.0 standard drinks     Types: 6 Cans of beer per week    Drug use: Never    Sexual activity: Not Currently     Review of Systems   Constitutional:  Positive for activity change and fatigue. Negative for appetite change and fever.   HENT:  Positive for congestion and rhinorrhea.    Eyes: Negative.    Respiratory:  Positive for cough.    Cardiovascular: Negative.    Gastrointestinal:  Negative for diarrhea, nausea and vomiting.   Endocrine: Negative.    Genitourinary: Negative.    Musculoskeletal:  Negative for back pain.   Skin:  Negative for rash (to the buttocks).   Allergic/Immunologic: Negative.    Neurological: Negative.    Hematological: Negative.    Psychiatric/Behavioral: Negative.     Objective:     Vital Signs (Most Recent):  Temp: 97.7 °F (36.5 °C) (05/07/23 0846)  Pulse: 71 (05/07/23 0846)  Resp: 20 (05/07/23 0846)  BP: 116/66 (05/07/23 0846)  SpO2: (!) 94 % (05/07/23 0724)   Vital Signs (24h Range):  Temp:  [97.7 °F (36.5 °C)-101.3 °F (38.5 °C)] 97.7 °F (36.5 °C)  Pulse:  [] 71  Resp:  [18-20] 20  SpO2:  [93 %-97 %] 94 %  BP: (106-119)/(48-66) 116/66     Weight: 80 kg (176 lb 5.9 oz)  Body mass index is 27.62 kg/m².    Physical Exam  Constitutional:       Appearance: Normal appearance. He is normal weight. He is not ill-appearing.      Comments: anxious   HENT:      Head: Normocephalic and atraumatic.      Left Ear: Tympanic membrane normal.      Nose: Congestion and rhinorrhea present.      Mouth/Throat:      Mouth: Mucous membranes are moist.      Pharynx: Oropharynx is clear. No oropharyngeal  exudate.   Eyes:      Extraocular Movements: Extraocular movements intact.      Conjunctiva/sclera: Conjunctivae normal.      Pupils: Pupils are equal, round, and reactive to light.   Cardiovascular:      Rate and Rhythm: Normal rate and regular rhythm.      Pulses: Normal pulses.      Heart sounds: Normal heart sounds. No murmur heard.    No gallop.   Pulmonary:      Effort: Pulmonary effort is normal.      Breath sounds: Normal breath sounds.   Abdominal:      General: Abdomen is flat. Bowel sounds are normal. There is no distension.      Palpations: Abdomen is soft.   Genitourinary:     Penis: Normal.    Musculoskeletal:         General: Normal range of motion.      Cervical back: Normal range of motion and neck supple.      Right lower leg: No edema.   Skin:     General: Skin is warm and dry.      Comments: Erythematous rash to the buttocks   Neurological:      General: No focal deficit present.      Mental Status: He is alert and oriented to person, place, and time. Mental status is at baseline.      Cranial Nerves: No cranial nerve deficit.      Motor: No weakness.      Gait: Gait normal.   Psychiatric:         Mood and Affect: Mood normal.         Behavior: Behavior normal.         Thought Content: Thought content normal.         Judgment: Judgment normal.         CRANIAL NERVES     CN III, IV, VI   Pupils are equal, round, and reactive to light.     Significant Labs: All pertinent labs within the past 24 hours have been reviewed.  CBC:   Recent Labs   Lab 05/07/23  0313   WBC 9.58   HGB 8.8*   HCT 28.7*          CMP:   Recent Labs   Lab 05/07/23  0313      K 5.2*   CO2 17*   BUN 34.7*   CREATININE 1.91*   CALCIUM 8.6*         Significant Imaging: I have reviewed all pertinent imaging results/findings within the past 24 hours.      Assessment/Plan:      * Duodenal ulcer    continue protonoix po  carafate tid  Tolerating regular diet    COVID-19  Likely present on admit  Seems  improved    Infiltrate noted on imaging study  Check cxr today , sats are good  Continue levaquin    Agitation    I tim pect underlying dementia compliacated by new surroundings and illness  Seems to worsen with transfusions/ anesthesia  Continue zyprexa at night 20 mg   septic work up wnl     cxr wnl  Ct head with vascular dementia changes otherwise wnl  Improved today    Chronic gastric ulcer with hemorrhage  As above  carafate and po ppi      Clostridium difficile infection  continue metronidazole  Lift isolation    Acute blood loss anemia    Improved after transfusion initially,  Now hemoglobin at 8, no active bleeding  Follow h/h continue iron  No active bleed on egd per dr. jeffers    Essential hypertension  Continue home meds and hydralazine prn, clonidine patch  Decrease nabivolol to 5mg po qd    Weakness    I v fluids.   likely due to dehydration and anemia,  covid  Continue pt  Will seek snf placement,  Bed available monday    Diarrhea of presumed infectious origin    Metronidazole for c-diff to po  Resolved will remove isolation    Dehydration  Resolving  Will d/c ns,  d5 1/2 ns at 150, hold hctz.  k increased      VTE Risk Mitigation (From admission, onward)         Ordered     IP VTE LOW RISK PATIENT  Once         04/26/23 1538     Place sequential compression device  Until discontinued         04/26/23 1538                Discharge Planning   KB: 5/5/2023     Code Status: Full Code   Is the patient medically ready for discharge?:     Reason for patient still in hospital (select all that apply): Patient trending condition  Discharge Plan A: Skilled Nursing Facility   Discharge Delays: None known at this time              Juan Quispe MD  Department of Hospital Medicine   [facility]

## 2023-05-08 VITALS
WEIGHT: 176.38 LBS | RESPIRATION RATE: 20 BRPM | HEIGHT: 67 IN | DIASTOLIC BLOOD PRESSURE: 52 MMHG | TEMPERATURE: 97 F | OXYGEN SATURATION: 92 % | BODY MASS INDEX: 27.68 KG/M2 | HEART RATE: 71 BPM | SYSTOLIC BLOOD PRESSURE: 117 MMHG

## 2023-05-08 DIAGNOSIS — U07.1 COVID-19 VIRUS DETECTED: ICD-10-CM

## 2023-05-08 LAB
ANION GAP SERPL CALC-SCNC: 8 MEQ/L
BASOPHILS # BLD AUTO: 0.03 X10(3)/MCL
BASOPHILS NFR BLD AUTO: 0.4 %
BUN SERPL-MCNC: 24 MG/DL (ref 8.4–25.7)
CALCIUM SERPL-MCNC: 8.6 MG/DL (ref 8.8–10)
CHLORIDE SERPL-SCNC: 116 MMOL/L (ref 98–107)
CO2 SERPL-SCNC: 17 MMOL/L (ref 23–31)
CREAT SERPL-MCNC: 1.39 MG/DL (ref 0.73–1.18)
CREAT/UREA NIT SERPL: 17
EOSINOPHIL # BLD AUTO: 0.14 X10(3)/MCL (ref 0–0.9)
EOSINOPHIL NFR BLD AUTO: 1.7 %
ERYTHROCYTE [DISTWIDTH] IN BLOOD BY AUTOMATED COUNT: 16.4 % (ref 11.5–17)
GFR SERPLBLD CREATININE-BSD FMLA CKD-EPI: 51 MLS/MIN/1.73/M2
GLUCOSE SERPL-MCNC: 128 MG/DL (ref 82–115)
HCT VFR BLD AUTO: 28.2 % (ref 42–52)
HGB BLD-MCNC: 8.7 G/DL (ref 14–18)
IMM GRANULOCYTES # BLD AUTO: 0.05 X10(3)/MCL (ref 0–0.04)
IMM GRANULOCYTES NFR BLD AUTO: 0.6 %
LYMPHOCYTES # BLD AUTO: 0.72 X10(3)/MCL (ref 0.6–4.6)
LYMPHOCYTES NFR BLD AUTO: 8.9 %
MCH RBC QN AUTO: 27.7 PG (ref 27–31)
MCHC RBC AUTO-ENTMCNC: 30.9 G/DL (ref 33–36)
MCV RBC AUTO: 89.8 FL (ref 80–94)
MONOCYTES # BLD AUTO: 0.98 X10(3)/MCL (ref 0.1–1.3)
MONOCYTES NFR BLD AUTO: 12.1 %
NEUTROPHILS # BLD AUTO: 6.21 X10(3)/MCL (ref 2.1–9.2)
NEUTROPHILS NFR BLD AUTO: 76.3 %
PLATELET # BLD AUTO: 270 X10(3)/MCL (ref 130–400)
PMV BLD AUTO: 9.2 FL (ref 7.4–10.4)
POTASSIUM SERPL-SCNC: 4.5 MMOL/L (ref 3.5–5.1)
RBC # BLD AUTO: 3.14 X10(6)/MCL (ref 4.7–6.1)
SODIUM SERPL-SCNC: 141 MMOL/L (ref 136–145)
WBC # SPEC AUTO: 8.13 X10(3)/MCL (ref 4.5–11.5)

## 2023-05-08 PROCEDURE — 85025 COMPLETE CBC W/AUTO DIFF WBC: CPT | Performed by: FAMILY MEDICINE

## 2023-05-08 PROCEDURE — 80048 BASIC METABOLIC PNL TOTAL CA: CPT | Performed by: FAMILY MEDICINE

## 2023-05-08 PROCEDURE — 27000221 HC OXYGEN, UP TO 24 HOURS

## 2023-05-08 PROCEDURE — 94761 N-INVAS EAR/PLS OXIMETRY MLT: CPT

## 2023-05-08 PROCEDURE — 25000242 PHARM REV CODE 250 ALT 637 W/ HCPCS: Performed by: FAMILY MEDICINE

## 2023-05-08 PROCEDURE — 25000003 PHARM REV CODE 250: Performed by: FAMILY MEDICINE

## 2023-05-08 PROCEDURE — 94799 UNLISTED PULMONARY SVC/PX: CPT

## 2023-05-08 PROCEDURE — S5010 5% DEXTROSE AND 0.45% SALINE: HCPCS | Performed by: FAMILY MEDICINE

## 2023-05-08 PROCEDURE — 25000003 PHARM REV CODE 250: Performed by: SURGERY

## 2023-05-08 PROCEDURE — 94640 AIRWAY INHALATION TREATMENT: CPT

## 2023-05-08 PROCEDURE — A4216 STERILE WATER/SALINE, 10 ML: HCPCS | Performed by: FAMILY MEDICINE

## 2023-05-08 RX ORDER — PANTOPRAZOLE SODIUM 40 MG/1
40 TABLET, DELAYED RELEASE ORAL DAILY
Qty: 30 TABLET | Refills: 11 | Status: SHIPPED | OUTPATIENT
Start: 2023-05-08 | End: 2024-05-07

## 2023-05-08 RX ORDER — NEBIVOLOL 5 MG/1
5 TABLET ORAL DAILY
Qty: 30 TABLET | Refills: 11 | Status: SHIPPED | OUTPATIENT
Start: 2023-05-08 | End: 2024-05-07

## 2023-05-08 RX ADMIN — Medication 250 MG: at 09:05

## 2023-05-08 RX ADMIN — HYDRALAZINE HYDROCHLORIDE 25 MG: 25 TABLET, FILM COATED ORAL at 09:05

## 2023-05-08 RX ADMIN — IPRATROPIUM BROMIDE AND ALBUTEROL SULFATE 3 ML: .5; 3 SOLUTION RESPIRATORY (INHALATION) at 03:05

## 2023-05-08 RX ADMIN — FERROUS GLUCONATE 324 MG: 324 TABLET ORAL at 09:05

## 2023-05-08 RX ADMIN — METRONIDAZOLE 500 MG: 250 TABLET ORAL at 09:05

## 2023-05-08 RX ADMIN — BUDESONIDE 0.5 MG: 0.5 INHALANT RESPIRATORY (INHALATION) at 07:05

## 2023-05-08 RX ADMIN — MICONAZOLE NITRATE: 20 CREAM TOPICAL at 09:05

## 2023-05-08 RX ADMIN — IPRATROPIUM BROMIDE AND ALBUTEROL SULFATE 3 ML: .5; 3 SOLUTION RESPIRATORY (INHALATION) at 07:05

## 2023-05-08 RX ADMIN — SUCRALFATE ORAL 1 G: 1 SUSPENSION ORAL at 09:05

## 2023-05-08 RX ADMIN — DEXTROSE AND SODIUM CHLORIDE: 5; 450 INJECTION, SOLUTION INTRAVENOUS at 06:05

## 2023-05-08 RX ADMIN — PANTOPRAZOLE SODIUM 40 MG: 40 TABLET, DELAYED RELEASE ORAL at 09:05

## 2023-05-08 RX ADMIN — SODIUM CHLORIDE, PRESERVATIVE FREE 10 ML: 5 INJECTION INTRAVENOUS at 12:05

## 2023-05-08 RX ADMIN — SODIUM CHLORIDE, PRESERVATIVE FREE 10 ML: 5 INJECTION INTRAVENOUS at 06:05

## 2023-05-08 RX ADMIN — IPRATROPIUM BROMIDE AND ALBUTEROL SULFATE 3 ML: .5; 3 SOLUTION RESPIRATORY (INHALATION) at 11:05

## 2023-05-08 RX ADMIN — SPIRONOLACTONE 25 MG: 25 TABLET ORAL at 09:05

## 2023-05-08 NOTE — PLAN OF CARE
Per Reina at [facility], they need low H&H and rising BUN/Cr addressed before they can take pt.  Messaged Dr. Quispe.  Informed nurse.    Dr. Quispe ordered labs, will see results.

## 2023-05-08 NOTE — PLAN OF CARE
Sent updated labs to Reina.  Patient will be going to [facility], not [facility]. Pt will be accepted today. Nurse to call report to [facility] and the facility van will come to transport patient.  Gave  nurse yellow manilla envelope for her to call report and d/c pt.

## 2023-05-08 NOTE — DISCHARGE SUMMARY
[facility]  Hospital Medicine  Discharge Summary      Patient Name: Claus Mcguire  MRN: [id number]  KEYLA: [id number]  Patient Class: IP- Inpatient  Admission Date: 4/26/2023  Hospital Length of Stay: 11 days  Discharge Date and Time:  05/08/2023 7:48 AM  Attending Physician: Juan Quispe MD   Discharging Provider: Juan Quispe MD  Primary Care Provider: Juan Quispe MD    Primary Care Team: Networked reference to record PCT     HPI:   The 82 year old known to me from clinic.  Presented with 3 days of weakness some nausea but no vomiting no recent travel no questionable food.  Labs were done today which showed increased BUN and creatinine and decreased sodium and chloride.  Both consistent with dehydration.  Also noted was a decrease in hemoglobin hematocrit 11 hemoglobin down to 8.1 in last 6 months.  Patient is weak at this time.  We are admitting the patient for IV fluid hydration workup of his diarrhea and workup of his acute anemia.      Procedure(s) (LRB):  EGD (ESOPHAGOGASTRODUODENOSCOPY) (N/A)  EGD, WITH CLOSED BIOPSY (N/A)      Hospital Course:     Patient is an 82-year-old  male known to me from clinic.  Was direct admitted from clinic for surgery or diarrhea.  Initially thought to be gastroenteritis.  Upon workup was found to have Clostridium difficile though only a day or 2 after admission his diarrhea ceased he was continued on metronidazole throughout his stay however.      He was noted incidentally to have heme-positive stools and was profoundly anemic.  Dr. Betty vasquez consulted performed EGD and found a bleeding gastric and duodenal ulcer the patient was placed on Protonix as well as sucralfate during his stay initially seen have cessation of his bleeding but in his hemoglobin hematocrit dropped again he had blood transfusions twice.  A repeat EGD did show resolution of the bleeding.  Patient's hemoglobin hematocrit was stable thereafter.       His height stay was complicated by some delirium and agitation.  He did require Zyprexa at night to help him sleep in his agitation cleared.  It seemed to follow his anesthesia from his EGDs.      Because of profound weakness the patient's family asked us to try to get him into skilled nursing.  We will attempt to get him in the skilled nursing was found to have a positive COVID test which was retained through the weekend so that we can get an isolation bed.  Patient did have some runny nose throughout his stay but never had decreased sats.  He did have questionable infiltrate on his chest x-ray and was followed with Levaquin throughout his stay.  Had only 1 episode of fever which has since resolved.             Goals of Care Treatment Preferences:  Code Status: Full Code      Consults:   Consults (From admission, onward)        Status Ordering Provider     Inpatient consult to Urology  Once        Provider:  Matteo St MD    Acknowledged DERRELL DIOP     Inpatient consult to Social Work/Case Management  Once        Provider:  (Not yet assigned)    Acknowledged MARY KATE MATTHEW     Inpatient consult to PICC team ([facility])  Once        Provider:  (Not yet assigned)    Acknowledged DERRELL DIOP     Inpatient consult to General Surgery  Once        Provider:  Lisa Ramesh MD    Acknowledged DERRELL DIOP          Psychiatric  Agitation    I tim pect underlying dementia compliacated by new surroundings and illness  Seems to worsen with transfusions/ anesthesia   septic work up wnl     cxr wnl  Ct head with vascular dementia changes otherwise wnl  Improved today    Cardiac/Vascular  Essential hypertension  Continue home meds and hydralazine prn, clonidine patch  Decrease nabivolol to 5mg po qd    Renal/  Dehydration  Resolving  Will d/c ns,  d5 1/2 ns at 150, hold hctz.  k increased    ID  COVID-19  Likely present on admit  Seems improved    Clostridium difficile infection  continue  metronidazole  Lift isolation    Oncology  Acute blood loss anemia    Improved after transfusion initially,  Now hemoglobin at 8, no active bleeding  Follow h/h continue iron  No active bleed on egd per dr. jeffers    GI  * Duodenal ulcer    continue protonoix po  carafate tid  Tolerating regular diet    Chronic gastric ulcer with hemorrhage  As above  carafate and po ppi      Diarrhea of presumed infectious origin    Metronidazole for c-diff to po  Resolved will remove isolation    Other  Infiltrate noted on imaging study  Check cxr today , sats are good  Continue levaquin    Weakness    I v fluids.   likely due to dehydration and anemia,  covid  Continue pt  Will seek snf placement,  Bed available monday      Final Active Diagnoses:    Diagnosis Date Noted POA    PRINCIPAL PROBLEM:  Duodenal ulcer [K26.9] 04/27/2023 Yes     Chronic    COVID-19 [U07.1] 05/06/2023 Yes    Chronic gastric ulcer with hemorrhage [K25.4] 04/28/2023 Yes    Agitation [R45.1] 04/28/2023 Yes    Infiltrate noted on imaging study [R93.89] 04/28/2023 Yes    Acute blood loss anemia [D62] 04/27/2023 Yes     Chronic    Clostridium difficile infection [A49.8] 04/27/2023 Yes    Dehydration [E86.0] 04/26/2023 Yes    Diarrhea of presumed infectious origin [R19.7] 04/26/2023 Yes    Weakness [R53.1] 04/26/2023 Yes    Essential hypertension [I10] 04/26/2023 Yes      Problems Resolved During this Admission:    Diagnosis Date Noted Date Resolved POA    Gastrointestinal hemorrhage associated with anorectal source [K62.5] 04/27/2023 05/02/2023 Yes    Other specified anemias [D64.89] 04/26/2023 05/02/2023 Yes       Discharged Condition: good    Disposition: Home or Self Care    Follow Up:   Follow-up Information     Juan Quispe MD Follow up in 3 day(s).    Specialty: Family Medicine  Contact information:  [address]  [address]  [place] [address] [phone]             Matteo St MD Follow up in 3 day(s).    Specialty:  Urology  Contact information:  [place] [address] [phone]                       Patient Instructions:      Diet diabetic     Diet Cardiac     Activity as tolerated       Significant Diagnostic Studies: N/A    Pending Diagnostic Studies:     Procedure Component Value Units Date/Time    Helicobacter Pylori Antigen Fecal EIA [[id number]]     Order Status: Sent Lab Status: No result     Specimen: Stool     OCCULT BLOOD STOOL, CA SCREEN 2ND SPEC [[id number]]     Order Status: Sent Lab Status: No result     Specimen: Stool     OCCULT BLOOD STOOL, CA SCREEN 3RD SPEC [[id number]]     Order Status: Sent Lab Status: No result     Specimen: Stool     Occult Blood Stool, CA Screen [[id number]]     Order Status: Sent Lab Status: No result     Specimen: Stool     Occult Blood, Stool Screening (1 -3) [[id number]]     Order Status: Sent Lab Status: No result     Specimen: Stool     Narrative:      The following orders were created for panel order Occult Blood, Stool Screening (1 -3).  Procedure                               Abnormality         Status                     ---------                               -----------         ------                     Occult Blood Stool, CA S...[[id number]]                                                 OCCULT BLOOD STOOL, CA S...[[id number]]                                                 OCCULT BLOOD STOOL, CA S...[[id number]]                                                   Please view results for these tests on the individual orders.    Occult blood x 3, stool [[id number]]     Order Status: Sent Lab Status: No result     Specimen: Stool     Urinalysis, Reflex to Urine Culture [[id number]]     Order Status: Sent Lab Status: No result     Specimen: Urine          Medications:  Reconciled Home Medications:      Medication List      START taking these medications    ascorbic acid (vitamin C) 250 MG tablet  Commonly known as: VITAMIN C  Take 1 tablet (250 mg total) by mouth  once daily.     cloNIDine 0.1 mg/24 hr td ptwk 0.1 mg/24 hr  Commonly known as: CATAPRES  Place 1 patch onto the skin every 7 days.     ferrous gluconate 324 MG tablet  Commonly known as: FERGON  Take 1 tablet (324 mg total) by mouth daily with breakfast.     levoFLOXacin 500 MG tablet  Commonly known as: LEVAQUIN  Take 1 tablet (500 mg total) by mouth once daily.     metroNIDAZOLE 500 MG tablet  Commonly known as: FLAGYL  Take 1 tablet (500 mg total) by mouth every 12 (twelve) hours.     miconazole 2 % cream  Commonly known as: MICOTIN  Apply topically 2 (two) times daily.     OLANZapine 20 MG tablet  Commonly known as: ZyPREXA  Take 1 tablet (20 mg total) by mouth every evening.     pantoprazole 40 MG tablet  Commonly known as: PROTONIX  Take 1 tablet (40 mg total) by mouth once daily.     simethicone 80 MG chewable tablet  Commonly known as: MYLICON  Take 1 tablet (80 mg total) by mouth 4 (four) times daily as needed for Flatulence.     sucralfate 100 mg/mL suspension  Commonly known as: CARAFATE  Take 10 mLs (1 g total) by mouth 3 (three) times daily.        CHANGE how you take these medications    * nebivoloL 5 MG Tab  Commonly known as: BYSTOLIC  Take 5 mg by mouth.  What changed: Another medication with the same name was added. Make sure you understand how and when to take each.     * nebivoloL 5 MG Tab  Commonly known as: BYSTOLIC  Take 1 tablet (5 mg total) by mouth once daily.  What changed: You were already taking a medication with the same name, and this prescription was added. Make sure you understand how and when to take each.     * olmesartan 40 MG tablet  Commonly known as: BENICAR  Take 40 mg by mouth.  What changed: Another medication with the same name was added. Make sure you understand how and when to take each.     * olmesartan 40 MG tablet  Commonly known as: BENICAR  Take 1 tablet (40 mg total) by mouth nightly.  What changed: You were already taking a medication with the same name, and this  prescription was added. Make sure you understand how and when to take each.         * This list has 4 medication(s) that are the same as other medications prescribed for you. Read the directions carefully, and ask your doctor or other care provider to review them with you.            CONTINUE taking these medications    amLODIPine 10 MG tablet  Commonly known as: NORVASC  Take 10 mg by mouth every evening.     hydrALAZINE 25 MG tablet  Commonly known as: APRESOLINE  Take 25 mg by mouth 2 (two) times daily.     spironolactone-hydrochlorothiazide 25-25mg 25-25 mg Tab  Commonly known as: ALDACTAZIDE  Take 1 tablet by mouth.     tamsulosin 0.4 mg Cap  Commonly known as: FLOMAX  Take 1 capsule by mouth every evening.        STOP taking these medications    meloxicam 15 MG tablet  Commonly known as: MOBIC            Indwelling Lines/Drains at time of discharge:   Lines/Drains/Airways     Peripherally Inserted Central Catheter Line  Duration           PICC Double Lumen 04/28/23 0746 left brachial 10 days          Drain  Duration                Urethral Catheter 05/05/23 3 days                Time spent on the discharge of patient: 45 minutes         Juan Quispe MD  Department of Hospital Medicine  [facility]

## 2023-05-08 NOTE — ASSESSMENT & PLAN NOTE
I tim shelton underlying dementia compliacated by new surroundings and illness  Seems to worsen with transfusions/ anesthesia   septic work up wnl     cxr wnl  Ct head with vascular dementia changes otherwise wnl  Improved today

## 2023-05-08 NOTE — DISCHARGE INSTRUCTIONS
Discharge instructions reviewed with patient and spouse at bedside  Dr. Quispe's nurse to call and schedule appointment.  Dr. St's office to call [facility] with follow up appointment date and time.  Will fax Discharge paperwork to Dr. Josue office for follow up appointment they will call with follow up date and time  Please take all medications as prescribed by your doctor  Please monitor Blood Pressure before taking medications    THANK YOU FOR CHOOSING [facility]   IT WAS A PLEASURE TAKING CARE OF YOU!!!

## 2023-05-08 NOTE — NURSING
Urology consult called into Dr. St's office. Left Message. Also Left note for unit secretary to follow up on consult on Monday morning.

## 2023-05-08 NOTE — NURSING
Jaylen gutierrez in route to  patient, informed nurse Twila patient will need oxygen tank 2L/NC. Paperwork at  ready for . Patient wife informed and ready to go.   It has been a pleasure taking care of Mr. Devlin and meeting his family. Mask provided for transportation.

## 2023-05-08 NOTE — NURSING
Report called in to Twila at [facility] stop date needed, informed her that I will add this to the discharge paperwork once I spoke with Dr. Quispe.  Dr. Cannon called stop date is 5/13/2023 Will place this stop date on discharge paperwork.

## 2023-05-09 ENCOUNTER — LAB REQUISITION (OUTPATIENT)
Dept: LAB | Facility: HOSPITAL | Age: 83
End: 2023-05-09
Payer: MEDICARE

## 2023-05-09 DIAGNOSIS — I50.33 ACUTE ON CHRONIC DIASTOLIC (CONGESTIVE) HEART FAILURE: ICD-10-CM

## 2023-05-09 LAB
BACTERIA BLD CULT: NORMAL
BNP BLD-MCNC: 191.2 PG/ML

## 2023-05-09 PROCEDURE — 83880 ASSAY OF NATRIURETIC PEPTIDE: CPT | Performed by: FAMILY MEDICINE

## 2023-05-09 NOTE — PHYSICIAN QUERY
PT Name: Claus Mcguire  MR #: [id number]    DOCUMENTATION CLARIFICATION     CDS/: Leoncio Javed RN, CCDS         Contact information:   [email or web address]     This form is a permanent document in the medical record.     Query Date: May 9, 2023    By submitting this query, we are merely seeking further clarification of documentation.  Please utilize your independent clinical judgment when addressing the question(s) below.    The medical record contains the following:  Pathology Findings Location in Medical Record   Final Diagnosis:   Gastric antrum biopsy:  Consistent with chronic atrophic antral gastritis.   No evidence of dysplasia or malignancy.  5/3 Final pathology        Please clarify the pathology findings.  [  x] Pathology findings noted above are ruled in/confirmed as diagnoses   [  ] Pathology findings noted above are not confirmed as diagnoses   [  ] Pathology findings noted above are incidental   [  ] Other diagnosis (please specify): ___________   [  ] Clinically Undetermined     Please document in your progress notes daily for the duration of treatment until resolved and include in your discharge summary.    Form No. [id number]

## 2023-05-14 ENCOUNTER — LAB REQUISITION (OUTPATIENT)
Dept: LAB | Facility: HOSPITAL | Age: 83
End: 2023-05-14
Payer: MEDICARE

## 2023-05-14 DIAGNOSIS — E86.0 DEHYDRATION: ICD-10-CM

## 2023-05-14 LAB
ALBUMIN SERPL-MCNC: 2.5 G/DL (ref 3.4–4.8)
ALBUMIN/GLOB SERPL: 0.8 RATIO (ref 1.1–2)
ALP SERPL-CCNC: 53 UNIT/L (ref 40–150)
ALT SERPL-CCNC: 15 UNIT/L (ref 0–55)
AST SERPL-CCNC: 12 UNIT/L (ref 5–34)
BILIRUBIN DIRECT+TOT PNL SERPL-MCNC: 0.2 MG/DL
BUN SERPL-MCNC: 29 MG/DL (ref 8.4–25.7)
CALCIUM SERPL-MCNC: 9 MG/DL (ref 8.8–10)
CHLORIDE SERPL-SCNC: 106 MMOL/L (ref 98–107)
CO2 SERPL-SCNC: 27 MMOL/L (ref 23–31)
CREAT SERPL-MCNC: 1.19 MG/DL (ref 0.73–1.18)
ERYTHROCYTE [DISTWIDTH] IN BLOOD BY AUTOMATED COUNT: 16.2 % (ref 11.5–17)
GFR SERPLBLD CREATININE-BSD FMLA CKD-EPI: >60 MLS/MIN/1.73/M2
GLOBULIN SER-MCNC: 3 GM/DL (ref 2.4–3.5)
GLUCOSE SERPL-MCNC: 145 MG/DL (ref 82–115)
HCT VFR BLD AUTO: 30.3 % (ref 42–52)
HGB BLD-MCNC: 9.3 G/DL (ref 14–18)
MCH RBC QN AUTO: 27.5 PG (ref 27–31)
MCHC RBC AUTO-ENTMCNC: 30.7 G/DL (ref 33–36)
MCV RBC AUTO: 89.6 FL (ref 80–94)
PLATELET # BLD AUTO: 346 X10(3)/MCL (ref 130–400)
PMV BLD AUTO: 9.9 FL (ref 7.4–10.4)
POTASSIUM SERPL-SCNC: 4.9 MMOL/L (ref 3.5–5.1)
PROT SERPL-MCNC: 5.5 GM/DL (ref 5.8–7.6)
RBC # BLD AUTO: 3.38 X10(6)/MCL (ref 4.7–6.1)
SODIUM SERPL-SCNC: 141 MMOL/L (ref 136–145)
WBC # SPEC AUTO: 13.32 X10(3)/MCL (ref 4.5–11.5)

## 2023-05-14 PROCEDURE — 85027 COMPLETE CBC AUTOMATED: CPT | Performed by: FAMILY MEDICINE

## 2023-05-14 PROCEDURE — 80053 COMPREHEN METABOLIC PANEL: CPT | Performed by: FAMILY MEDICINE

## 2023-05-15 ENCOUNTER — LAB REQUISITION (OUTPATIENT)
Dept: LAB | Facility: HOSPITAL | Age: 83
End: 2023-05-15
Payer: MEDICARE

## 2023-05-15 DIAGNOSIS — N40.0 BENIGN PROSTATIC HYPERPLASIA WITHOUT LOWER URINARY TRACT SYMPTOMS: ICD-10-CM

## 2023-05-15 LAB
ALBUMIN SERPL-MCNC: 2.7 G/DL (ref 3.4–4.8)
ALBUMIN/GLOB SERPL: 0.9 RATIO (ref 1.1–2)
ALP SERPL-CCNC: 52 UNIT/L (ref 40–150)
ALT SERPL-CCNC: 16 UNIT/L (ref 0–55)
AST SERPL-CCNC: 10 UNIT/L (ref 5–34)
BASOPHILS # BLD AUTO: 0.03 X10(3)/MCL
BASOPHILS NFR BLD AUTO: 0.2 %
BILIRUBIN DIRECT+TOT PNL SERPL-MCNC: 0.3 MG/DL
BUN SERPL-MCNC: 42 MG/DL (ref 8.4–25.7)
CALCIUM SERPL-MCNC: 9.1 MG/DL (ref 8.8–10)
CHLORIDE SERPL-SCNC: 106 MMOL/L (ref 98–107)
CO2 SERPL-SCNC: 25 MMOL/L (ref 23–31)
CREAT SERPL-MCNC: 1.83 MG/DL (ref 0.73–1.18)
EOSINOPHIL # BLD AUTO: 0 X10(3)/MCL (ref 0–0.9)
EOSINOPHIL NFR BLD AUTO: 0 %
ERYTHROCYTE [DISTWIDTH] IN BLOOD BY AUTOMATED COUNT: 16.7 % (ref 11.5–17)
GFR SERPLBLD CREATININE-BSD FMLA CKD-EPI: 37 MLS/MIN/1.73/M2
GLOBULIN SER-MCNC: 3.1 GM/DL (ref 2.4–3.5)
GLUCOSE SERPL-MCNC: 141 MG/DL (ref 82–115)
HCT VFR BLD AUTO: 32.3 % (ref 42–52)
HGB BLD-MCNC: 9.7 G/DL (ref 14–18)
IMM GRANULOCYTES # BLD AUTO: 0.31 X10(3)/MCL (ref 0–0.04)
IMM GRANULOCYTES NFR BLD AUTO: 2.1 %
LYMPHOCYTES # BLD AUTO: 0.97 X10(3)/MCL (ref 0.6–4.6)
LYMPHOCYTES NFR BLD AUTO: 6.7 %
MCH RBC QN AUTO: 26.6 PG (ref 27–31)
MCHC RBC AUTO-ENTMCNC: 30 G/DL (ref 33–36)
MCV RBC AUTO: 88.7 FL (ref 80–94)
MONOCYTES # BLD AUTO: 1.64 X10(3)/MCL (ref 0.1–1.3)
MONOCYTES NFR BLD AUTO: 11.3 %
NEUTROPHILS # BLD AUTO: 11.56 X10(3)/MCL (ref 2.1–9.2)
NEUTROPHILS NFR BLD AUTO: 79.7 %
PLATELET # BLD AUTO: 344 X10(3)/MCL (ref 130–400)
PMV BLD AUTO: 10.1 FL (ref 7.4–10.4)
POTASSIUM SERPL-SCNC: 5.1 MMOL/L (ref 3.5–5.1)
PROT SERPL-MCNC: 5.8 GM/DL (ref 5.8–7.6)
RBC # BLD AUTO: 3.64 X10(6)/MCL (ref 4.7–6.1)
SODIUM SERPL-SCNC: 141 MMOL/L (ref 136–145)
WBC # SPEC AUTO: 14.51 X10(3)/MCL (ref 4.5–11.5)

## 2023-05-15 PROCEDURE — 85025 COMPLETE CBC W/AUTO DIFF WBC: CPT | Performed by: FAMILY MEDICINE

## 2023-05-15 PROCEDURE — 87088 URINE BACTERIA CULTURE: CPT | Performed by: FAMILY MEDICINE

## 2023-05-15 PROCEDURE — 80053 COMPREHEN METABOLIC PANEL: CPT | Performed by: FAMILY MEDICINE

## 2023-05-17 LAB — BACTERIA UR CULT: NO GROWTH

## 2023-07-31 PROBLEM — K25.4 CHRONIC GASTRIC ULCER WITH HEMORRHAGE: Status: RESOLVED | Noted: 2023-04-28 | Resolved: 2023-07-31

## 2024-05-09 ENCOUNTER — HOSPITAL ENCOUNTER (OUTPATIENT)
Dept: RADIOLOGY | Facility: HOSPITAL | Age: 84
Discharge: HOME | End: 2024-05-09
Attending: FAMILY MEDICINE
Payer: MEDICARE

## 2024-05-09 DIAGNOSIS — M54.59 OTHER LOW BACK PAIN: ICD-10-CM

## 2024-05-09 PROCEDURE — 72100 X-RAY EXAM L-S SPINE 2/3 VWS: CPT | Mod: TC

## 2025-08-05 ENCOUNTER — HOSPITAL ENCOUNTER (OUTPATIENT)
Dept: RADIOLOGY | Facility: HOSPITAL | Age: 85
Discharge: HOME | End: 2025-08-05
Attending: FAMILY MEDICINE
Payer: MEDICARE

## 2025-08-05 DIAGNOSIS — R05.9 COUGH: ICD-10-CM

## 2025-08-05 PROCEDURE — 71046 X-RAY EXAM CHEST 2 VIEWS: CPT | Mod: TC

## (undated) DEVICE — KIT SURGICAL COLON .25 1.1OZ

## (undated) DEVICE — FORCEP CAPTURA PRO SPK 230CM